# Patient Record
Sex: FEMALE | Race: BLACK OR AFRICAN AMERICAN | NOT HISPANIC OR LATINO | URBAN - METROPOLITAN AREA
[De-identification: names, ages, dates, MRNs, and addresses within clinical notes are randomized per-mention and may not be internally consistent; named-entity substitution may affect disease eponyms.]

---

## 2019-11-07 ENCOUNTER — INPATIENT (INPATIENT)
Facility: HOSPITAL | Age: 54
LOS: 1 days | Discharge: HOME | End: 2019-11-09
Attending: STUDENT IN AN ORGANIZED HEALTH CARE EDUCATION/TRAINING PROGRAM | Admitting: STUDENT IN AN ORGANIZED HEALTH CARE EDUCATION/TRAINING PROGRAM
Payer: COMMERCIAL

## 2019-11-07 VITALS
WEIGHT: 167.99 LBS | HEIGHT: 65 IN | TEMPERATURE: 97 F | DIASTOLIC BLOOD PRESSURE: 68 MMHG | SYSTOLIC BLOOD PRESSURE: 104 MMHG | HEART RATE: 66 BPM | RESPIRATION RATE: 18 BRPM | OXYGEN SATURATION: 100 %

## 2019-11-07 LAB
ALBUMIN SERPL ELPH-MCNC: 3.7 G/DL — SIGNIFICANT CHANGE UP (ref 3.3–5)
ALP SERPL-CCNC: 141 U/L — HIGH (ref 40–120)
ALT FLD-CCNC: 124 U/L — HIGH (ref 12–78)
ANION GAP SERPL CALC-SCNC: 5 MMOL/L — SIGNIFICANT CHANGE UP (ref 5–17)
APPEARANCE UR: CLEAR — SIGNIFICANT CHANGE UP
APTT BLD: 20.5 SEC — LOW (ref 27.5–36.3)
AST SERPL-CCNC: 167 U/L — HIGH (ref 15–37)
BACTERIA # UR AUTO: ABNORMAL
BASOPHILS # BLD AUTO: 0.04 K/UL — SIGNIFICANT CHANGE UP (ref 0–0.2)
BASOPHILS NFR BLD AUTO: 0.3 % — SIGNIFICANT CHANGE UP (ref 0–2)
BILIRUB SERPL-MCNC: 0.4 MG/DL — SIGNIFICANT CHANGE UP (ref 0.2–1.2)
BILIRUB UR-MCNC: NEGATIVE — SIGNIFICANT CHANGE UP
BUN SERPL-MCNC: 17 MG/DL — SIGNIFICANT CHANGE UP (ref 7–23)
CALCIUM SERPL-MCNC: 9.5 MG/DL — SIGNIFICANT CHANGE UP (ref 8.5–10.1)
CHLORIDE SERPL-SCNC: 109 MMOL/L — HIGH (ref 96–108)
CO2 SERPL-SCNC: 26 MMOL/L — SIGNIFICANT CHANGE UP (ref 22–31)
COLOR SPEC: YELLOW — SIGNIFICANT CHANGE UP
CREAT SERPL-MCNC: 0.79 MG/DL — SIGNIFICANT CHANGE UP (ref 0.5–1.3)
DIFF PNL FLD: ABNORMAL
EOSINOPHIL # BLD AUTO: 0.11 K/UL — SIGNIFICANT CHANGE UP (ref 0–0.5)
EOSINOPHIL NFR BLD AUTO: 0.9 % — SIGNIFICANT CHANGE UP (ref 0–6)
EPI CELLS # UR: SIGNIFICANT CHANGE UP
GLUCOSE SERPL-MCNC: 89 MG/DL — SIGNIFICANT CHANGE UP (ref 70–99)
GLUCOSE UR QL: NEGATIVE MG/DL — SIGNIFICANT CHANGE UP
HCT VFR BLD CALC: 43.2 % — SIGNIFICANT CHANGE UP (ref 34.5–45)
HGB BLD-MCNC: 14.1 G/DL — SIGNIFICANT CHANGE UP (ref 11.5–15.5)
IMM GRANULOCYTES NFR BLD AUTO: 0.4 % — SIGNIFICANT CHANGE UP (ref 0–1.5)
INR BLD: 1.06 RATIO — SIGNIFICANT CHANGE UP (ref 0.88–1.16)
KETONES UR-MCNC: ABNORMAL
LACTATE SERPL-SCNC: 1.6 MMOL/L — SIGNIFICANT CHANGE UP (ref 0.7–2)
LEUKOCYTE ESTERASE UR-ACNC: ABNORMAL
LIDOCAIN IGE QN: 170 U/L — SIGNIFICANT CHANGE UP (ref 73–393)
LYMPHOCYTES # BLD AUTO: 25.4 % — SIGNIFICANT CHANGE UP (ref 13–44)
LYMPHOCYTES # BLD AUTO: 3.02 K/UL — SIGNIFICANT CHANGE UP (ref 1–3.3)
MCHC RBC-ENTMCNC: 26.9 PG — LOW (ref 27–34)
MCHC RBC-ENTMCNC: 32.6 GM/DL — SIGNIFICANT CHANGE UP (ref 32–36)
MCV RBC AUTO: 82.3 FL — SIGNIFICANT CHANGE UP (ref 80–100)
MONOCYTES # BLD AUTO: 0.84 K/UL — SIGNIFICANT CHANGE UP (ref 0–0.9)
MONOCYTES NFR BLD AUTO: 7.1 % — SIGNIFICANT CHANGE UP (ref 2–14)
NEUTROPHILS # BLD AUTO: 7.81 K/UL — HIGH (ref 1.8–7.4)
NEUTROPHILS NFR BLD AUTO: 65.9 % — SIGNIFICANT CHANGE UP (ref 43–77)
NITRITE UR-MCNC: NEGATIVE — SIGNIFICANT CHANGE UP
NRBC # BLD: 0 /100 WBCS — SIGNIFICANT CHANGE UP (ref 0–0)
PH UR: 5 — SIGNIFICANT CHANGE UP (ref 5–8)
PLATELET # BLD AUTO: 215 K/UL — SIGNIFICANT CHANGE UP (ref 150–400)
POTASSIUM SERPL-MCNC: 3.8 MMOL/L — SIGNIFICANT CHANGE UP (ref 3.5–5.3)
POTASSIUM SERPL-SCNC: 3.8 MMOL/L — SIGNIFICANT CHANGE UP (ref 3.5–5.3)
PROT SERPL-MCNC: 7.4 GM/DL — SIGNIFICANT CHANGE UP (ref 6–8.3)
PROT UR-MCNC: NEGATIVE MG/DL — SIGNIFICANT CHANGE UP
PROTHROM AB SERPL-ACNC: 11.9 SEC — SIGNIFICANT CHANGE UP (ref 10–12.9)
RBC # BLD: 5.25 M/UL — HIGH (ref 3.8–5.2)
RBC # FLD: 13.6 % — SIGNIFICANT CHANGE UP (ref 10.3–14.5)
RBC CASTS # UR COMP ASSIST: SIGNIFICANT CHANGE UP /HPF (ref 0–4)
SODIUM SERPL-SCNC: 140 MMOL/L — SIGNIFICANT CHANGE UP (ref 135–145)
SP GR SPEC: 1.03 — HIGH (ref 1.01–1.02)
TROPONIN I SERPL-MCNC: <.015 NG/ML — SIGNIFICANT CHANGE UP (ref 0.01–0.04)
UROBILINOGEN FLD QL: NEGATIVE MG/DL — SIGNIFICANT CHANGE UP
WBC # BLD: 11.87 K/UL — HIGH (ref 3.8–10.5)
WBC # FLD AUTO: 11.87 K/UL — HIGH (ref 3.8–10.5)
WBC UR QL: SIGNIFICANT CHANGE UP

## 2019-11-07 PROCEDURE — 99221 1ST HOSP IP/OBS SF/LOW 40: CPT

## 2019-11-07 PROCEDURE — 99285 EMERGENCY DEPT VISIT HI MDM: CPT

## 2019-11-07 PROCEDURE — 93010 ELECTROCARDIOGRAM REPORT: CPT

## 2019-11-07 PROCEDURE — 76700 US EXAM ABDOM COMPLETE: CPT | Mod: 26

## 2019-11-07 RX ORDER — PIPERACILLIN AND TAZOBACTAM 4; .5 G/20ML; G/20ML
3.38 INJECTION, POWDER, LYOPHILIZED, FOR SOLUTION INTRAVENOUS ONCE
Refills: 0 | Status: COMPLETED | OUTPATIENT
Start: 2019-11-07 | End: 2019-11-07

## 2019-11-07 RX ORDER — ONDANSETRON 8 MG/1
4 TABLET, FILM COATED ORAL EVERY 6 HOURS
Refills: 0 | Status: DISCONTINUED | OUTPATIENT
Start: 2019-11-07 | End: 2019-11-09

## 2019-11-07 RX ORDER — MORPHINE SULFATE 50 MG/1
2 CAPSULE, EXTENDED RELEASE ORAL ONCE
Refills: 0 | Status: DISCONTINUED | OUTPATIENT
Start: 2019-11-07 | End: 2019-11-07

## 2019-11-07 RX ORDER — ONDANSETRON 8 MG/1
4 TABLET, FILM COATED ORAL ONCE
Refills: 0 | Status: COMPLETED | OUTPATIENT
Start: 2019-11-07 | End: 2019-11-07

## 2019-11-07 RX ORDER — SODIUM CHLORIDE 9 MG/ML
1000 INJECTION INTRAMUSCULAR; INTRAVENOUS; SUBCUTANEOUS ONCE
Refills: 0 | Status: COMPLETED | OUTPATIENT
Start: 2019-11-07 | End: 2019-11-07

## 2019-11-07 RX ORDER — MORPHINE SULFATE 50 MG/1
2 CAPSULE, EXTENDED RELEASE ORAL EVERY 6 HOURS
Refills: 0 | Status: DISCONTINUED | OUTPATIENT
Start: 2019-11-07 | End: 2019-11-09

## 2019-11-07 RX ORDER — FAMOTIDINE 10 MG/ML
20 INJECTION INTRAVENOUS ONCE
Refills: 0 | Status: COMPLETED | OUTPATIENT
Start: 2019-11-07 | End: 2019-11-07

## 2019-11-07 RX ORDER — CEFTRIAXONE 500 MG/1
1000 INJECTION, POWDER, FOR SOLUTION INTRAMUSCULAR; INTRAVENOUS ONCE
Refills: 0 | Status: COMPLETED | OUTPATIENT
Start: 2019-11-07 | End: 2019-11-07

## 2019-11-07 RX ORDER — SODIUM CHLORIDE 9 MG/ML
1000 INJECTION, SOLUTION INTRAVENOUS
Refills: 0 | Status: DISCONTINUED | OUTPATIENT
Start: 2019-11-07 | End: 2019-11-08

## 2019-11-07 RX ADMIN — MORPHINE SULFATE 2 MILLIGRAM(S): 50 CAPSULE, EXTENDED RELEASE ORAL at 21:21

## 2019-11-07 RX ADMIN — FAMOTIDINE 20 MILLIGRAM(S): 10 INJECTION INTRAVENOUS at 21:20

## 2019-11-07 RX ADMIN — PIPERACILLIN AND TAZOBACTAM 200 GRAM(S): 4; .5 INJECTION, POWDER, LYOPHILIZED, FOR SOLUTION INTRAVENOUS at 21:53

## 2019-11-07 RX ADMIN — ONDANSETRON 4 MILLIGRAM(S): 8 TABLET, FILM COATED ORAL at 21:20

## 2019-11-07 RX ADMIN — SODIUM CHLORIDE 1000 MILLILITER(S): 9 INJECTION INTRAMUSCULAR; INTRAVENOUS; SUBCUTANEOUS at 22:18

## 2019-11-07 RX ADMIN — SODIUM CHLORIDE 1000 MILLILITER(S): 9 INJECTION INTRAMUSCULAR; INTRAVENOUS; SUBCUTANEOUS at 21:20

## 2019-11-07 RX ADMIN — CEFTRIAXONE 100 MILLIGRAM(S): 500 INJECTION, POWDER, FOR SOLUTION INTRAMUSCULAR; INTRAVENOUS at 23:51

## 2019-11-07 RX ADMIN — PIPERACILLIN AND TAZOBACTAM 3.38 GRAM(S): 4; .5 INJECTION, POWDER, LYOPHILIZED, FOR SOLUTION INTRAVENOUS at 22:18

## 2019-11-07 RX ADMIN — MORPHINE SULFATE 2 MILLIGRAM(S): 50 CAPSULE, EXTENDED RELEASE ORAL at 21:45

## 2019-11-07 RX ADMIN — SODIUM CHLORIDE 1000 MILLILITER(S): 9 INJECTION INTRAMUSCULAR; INTRAVENOUS; SUBCUTANEOUS at 23:52

## 2019-11-07 NOTE — ED ADULT NURSE NOTE - CHPI ED NUR SYMPTOMS NEG
no diarrhea/no vomiting/no nausea/no fever/no abdominal distension/no chills/no blood in stool/no burning urination/no dysuria/no hematuria

## 2019-11-07 NOTE — ED PROVIDER NOTE - PHYSICAL EXAMINATION
Gen: Alert, c/o pain  Head: NC, AT, EOMI, normal lids/conjunctiva  ENT: normal hearing, patent oropharynx, MMM  Neck: supple, no tenderness/meningismus, Trachea midline  Pulm: Bilateral clear BS, normal resp effort, no wheeze/stridor/retractions  CV: RRR, no M/R/G, +dist pulses  Abd: soft, +epigastric TTP, ND, +BS, no guarding/rebound tenderness  Mskel: no edema/erythema/cyanosis  Skin: no rash  Neuro: no sensory/motor deficits

## 2019-11-07 NOTE — ED PROVIDER NOTE - OBJECTIVE STATEMENT
Pertinent PMH/PSH/FHx/SHx and Review of Systems contained within:    52yo F w no significant PMH/PSH, s/p recent completion of abx for hematuria/presumed UTI presents to ED for eval of abd pain s/p eating coconut cake.  Pt states she was in usual state of health until after eating the cake when pain started, +vomiting.  Pt states she had one normal BM without improvement of pain.  Denies fever, urinary sx, previous episodes, diarrhea, CP, SOB.      No fever, No photophobia/eye pain/changes in vision, No ear pain/sore throat/dysphagia, No chest pain/palpitations, no SOB/cough/wheeze/stridor, +abdominal pain, No neck/back pain, no rash, no changes in neurological status/function.

## 2019-11-07 NOTE — H&P ADULT - ATTENDING COMMENTS
I saw and examined the patient at bedside. Complaining of mild RUQ/epigastric discomfort, but abdomen is soft, non-distended, non-TTP in other quadrants. Was planned for HIDA given that imaging was not consistent with acute cholecystitis.

## 2019-11-07 NOTE — H&P ADULT - HISTORY OF PRESENT ILLNESS
52y/o female with no significant PMH and PSH of tubo-ligation present to er with c/o epigastric abdominal pain that started a couple hrs ago. Pain is severe 10/10, started suddenly after she ate a coconut confection, located in the epigastric region of her stomach, denies radiation, denies taking anything to relieve pain. Pain associated with nausea and 1 episode of vomiting( gastric content). Denies similar symptoms in past. Denies fever /chills, no cp/sob. +flatus and normal BM, last BM was this morning. Recently had annual PE, states she has been having a little discomfort and burning when she urinates but thought she had a yeast infection so she was using OTC feminine products.

## 2019-11-07 NOTE — ED ADULT TRIAGE NOTE - CHIEF COMPLAINT QUOTE
Pt c/o Upper ABD pain and vomiting x 2 hrs. Pt stated, she ate coconut cake, shortly after started to feel the pain.

## 2019-11-07 NOTE — H&P ADULT - ASSESSMENT
52y/o female with abdominal pain, found to have gallstones, gallbladder wall thickening and dilated CBD (8mm) on US, also with UTI  -as discussed with Dr. Burden  -admit pt, r/o cholecystitis vs choledocholithiasis  -HIDA Scan  -dvt/gi ppx  - pain management  -NPO/IVF  - Rocephin 1gram for UTI  - trend Labs  - dvt ppx

## 2019-11-07 NOTE — ED ADULT NURSE NOTE - OBJECTIVE STATEMENT
Patient reports 2 hours of diffuse abdominal pain with nausea and vomiting after earing carrot cake. Patient reports recent UTI.

## 2019-11-07 NOTE — H&P ADULT - NSHPREVIEWOFSYSTEMS_GEN_ALL_CORE
No fever, No photophobia/eye pain/changes in vision, No ear pain/sore throat/dysphagia, No chest pain/palpitations, no SOB/cough/wheeze/stridor, +abdominal pain, No neck/back pain, no rash, no changes in neurological status/function.

## 2019-11-08 ENCOUNTER — TRANSCRIPTION ENCOUNTER (OUTPATIENT)
Age: 54
End: 2019-11-08

## 2019-11-08 DIAGNOSIS — K83.8 OTHER SPECIFIED DISEASES OF BILIARY TRACT: ICD-10-CM

## 2019-11-08 DIAGNOSIS — Z31.0 ENCOUNTER FOR REVERSAL OF PREVIOUS STERILIZATION: Chronic | ICD-10-CM

## 2019-11-08 DIAGNOSIS — R94.5 ABNORMAL RESULTS OF LIVER FUNCTION STUDIES: ICD-10-CM

## 2019-11-08 LAB
ALBUMIN SERPL ELPH-MCNC: 3.2 G/DL — LOW (ref 3.3–5)
ALP SERPL-CCNC: 123 U/L — HIGH (ref 40–120)
ALT FLD-CCNC: 156 U/L — HIGH (ref 12–78)
ANION GAP SERPL CALC-SCNC: 5 MMOL/L — SIGNIFICANT CHANGE UP (ref 5–17)
AST SERPL-CCNC: 138 U/L — HIGH (ref 15–37)
BILIRUB DIRECT SERPL-MCNC: 0.11 MG/DL — SIGNIFICANT CHANGE UP (ref 0.05–0.2)
BILIRUB INDIRECT FLD-MCNC: 0.4 MG/DL — SIGNIFICANT CHANGE UP (ref 0.2–1)
BILIRUB SERPL-MCNC: 0.5 MG/DL — SIGNIFICANT CHANGE UP (ref 0.2–1.2)
BLD GP AB SCN SERPL QL: SIGNIFICANT CHANGE UP
BUN SERPL-MCNC: 9 MG/DL — SIGNIFICANT CHANGE UP (ref 7–23)
CALCIUM SERPL-MCNC: 9 MG/DL — SIGNIFICANT CHANGE UP (ref 8.5–10.1)
CHLORIDE SERPL-SCNC: 113 MMOL/L — HIGH (ref 96–108)
CO2 SERPL-SCNC: 25 MMOL/L — SIGNIFICANT CHANGE UP (ref 22–31)
CREAT SERPL-MCNC: 0.62 MG/DL — SIGNIFICANT CHANGE UP (ref 0.5–1.3)
CULTURE RESULTS: SIGNIFICANT CHANGE UP
GLUCOSE SERPL-MCNC: 80 MG/DL — SIGNIFICANT CHANGE UP (ref 70–99)
HCT VFR BLD CALC: 37.9 % — SIGNIFICANT CHANGE UP (ref 34.5–45)
HGB BLD-MCNC: 12.3 G/DL — SIGNIFICANT CHANGE UP (ref 11.5–15.5)
MCHC RBC-ENTMCNC: 27 PG — SIGNIFICANT CHANGE UP (ref 27–34)
MCHC RBC-ENTMCNC: 32.5 GM/DL — SIGNIFICANT CHANGE UP (ref 32–36)
MCV RBC AUTO: 83.3 FL — SIGNIFICANT CHANGE UP (ref 80–100)
NRBC # BLD: 0 /100 WBCS — SIGNIFICANT CHANGE UP (ref 0–0)
PLATELET # BLD AUTO: 189 K/UL — SIGNIFICANT CHANGE UP (ref 150–400)
POTASSIUM SERPL-MCNC: 4.1 MMOL/L — SIGNIFICANT CHANGE UP (ref 3.5–5.3)
POTASSIUM SERPL-SCNC: 4.1 MMOL/L — SIGNIFICANT CHANGE UP (ref 3.5–5.3)
PROT SERPL-MCNC: 6.5 GM/DL — SIGNIFICANT CHANGE UP (ref 6–8.3)
RBC # BLD: 4.55 M/UL — SIGNIFICANT CHANGE UP (ref 3.8–5.2)
RBC # FLD: 13.8 % — SIGNIFICANT CHANGE UP (ref 10.3–14.5)
SODIUM SERPL-SCNC: 143 MMOL/L — SIGNIFICANT CHANGE UP (ref 135–145)
SPECIMEN SOURCE: SIGNIFICANT CHANGE UP
WBC # BLD: 6.91 K/UL — SIGNIFICANT CHANGE UP (ref 3.8–10.5)
WBC # FLD AUTO: 6.91 K/UL — SIGNIFICANT CHANGE UP (ref 3.8–10.5)

## 2019-11-08 PROCEDURE — 78226 HEPATOBILIARY SYSTEM IMAGING: CPT | Mod: 26

## 2019-11-08 PROCEDURE — 99231 SBSQ HOSP IP/OBS SF/LOW 25: CPT

## 2019-11-08 PROCEDURE — 74181 MRI ABDOMEN W/O CONTRAST: CPT | Mod: 26

## 2019-11-08 RX ORDER — PANTOPRAZOLE SODIUM 20 MG/1
40 TABLET, DELAYED RELEASE ORAL DAILY
Refills: 0 | Status: DISCONTINUED | OUTPATIENT
Start: 2019-11-08 | End: 2019-11-09

## 2019-11-08 RX ORDER — LANOLIN ALCOHOL/MO/W.PET/CERES
3 CREAM (GRAM) TOPICAL AT BEDTIME
Refills: 0 | Status: DISCONTINUED | OUTPATIENT
Start: 2019-11-08 | End: 2019-11-09

## 2019-11-08 RX ORDER — SINCALIDE 5 UG/5ML
1.5 INJECTION, POWDER, LYOPHILIZED, FOR SOLUTION INTRAVENOUS ONCE
Refills: 0 | Status: COMPLETED | OUTPATIENT
Start: 2019-11-08 | End: 2019-11-08

## 2019-11-08 RX ADMIN — PANTOPRAZOLE SODIUM 40 MILLIGRAM(S): 20 TABLET, DELAYED RELEASE ORAL at 13:03

## 2019-11-08 RX ADMIN — Medication 3 MILLIGRAM(S): at 21:44

## 2019-11-08 RX ADMIN — SINCALIDE 1.5 MICROGRAM(S): 5 INJECTION, POWDER, LYOPHILIZED, FOR SOLUTION INTRAVENOUS at 10:05

## 2019-11-08 NOTE — CONSULT NOTE ADULT - SUBJECTIVE AND OBJECTIVE BOX
Chief Complaint:  Patient is a 53y old  Female who presents with a chief complaint of Abdominal pain, gallstones, dilated CBD (2019 11:39)      HPI:  52y/o female with no significant PMH and PSH of tubo-ligation present to er with c/o epigastric abdominal pain that started a couple hrs ago. Pain is severe 10/10, started suddenly after she ate a coconut confection, located in the epigastric region of her stomach, denies radiation, denies taking anything to relieve pain. Pain associated with nausea and 1 episode of vomiting( gastric content). Denies similar symptoms in past. Denies fever /chills, no cp/sob. +flatus and normal BM, last BM was this morning. Recently had annual PE, states she has been having a little discomfort and burning when she urinates but thought she had a yeast infection so she was using OTC feminine products. (2019 22:33). GI was consulted for ther above. She reports a few hours of severe epigastric, non-radiating abdominal discomfort, starting 30 minutes after eating, and lasting for a few hours, assc with nausea/nonbloody nonbilious vomiting. She denies any new foods, new medications. She denies any recent travel or sick contacts. She denies any similar previous episodes of abdominal discomfort. She denies any NSAID or ETOH use. No previous endoscopy. Last colonoscopy ~, normal per patient.  Currently she was feeling well without any abdominal pain, tolerating full liquid diet well.       PMH/PSH:PAST MEDICAL & SURGICAL HISTORY:  No pertinent past medical history  H/O post-sterilization tuboplasty      Allergies:  No Known Allergies      Medications:  dextrose 5% + sodium chloride 0.9%. 1000 milliLiter(s) IV Continuous <Continuous>  melatonin 3 milliGRAM(s) Oral at bedtime  morphine  - Injectable 2 milliGRAM(s) IV Push every 6 hours PRN  ondansetron Injectable 4 milliGRAM(s) IV Push every 6 hours PRN  pantoprazole  Injectable 40 milliGRAM(s) IV Push daily      Review of Systems:    General:  No weight loss, fevers, chills, night sweats, fatigue,   Eyes:  Good vision, no reported pain  ENT:  No sore throat, pain, runny nose, dysphagia  CV:  No pain, palpitations, hypo/hypertension  Resp:  No dyspnea, cough, tachypnea, wheezing  GI:  as per HPI   :  No pain, bleeding, incontinence, nocturia  Muscle:  No pain, weakness  Breast:  No pain, abscess, mass, discharge  Neuro:  No weakness, tingling, memory problems  Psych:  No fatigue, insomnia, mood problems, depression  Endocrine:  No polyuria, polydipsia, cold/heat intolerance  Heme:  No petechiae, ecchymosis, easy bruisability  Skin:  No rash, tattoos, scars, edema    Relevant Family History:   FAMILY HISTORY:  Family history of diabetes mellitus (DM)  FH: HTN (hypertension)      Relevant Social History: Alcohol ( -) , Tobacco ( -) , Illicit drugs (- )     Physical Exam:    Vital Signs:  Vital Signs Last 24 Hrs  T(C): 36.1 (2019 11:19), Max: 36.7 (2019 23:30)  T(F): 96.9 (2019 11:19), Max: 98.1 (2019 23:30)  HR: 64 (2019 11:19) (60 - 78)  BP: 113/61 (2019 11:19) (98/59 - 117/64)  BP(mean): --  RR: 18 (2019 11:19) (18 - 18)  SpO2: 100% (2019 11:19) (99% - 100%)  Daily Height in cm: 165.1 (2019 19:35)    Daily Weight in k.5 (2019 00:45)    General:  Appears stated age, well-groomed, well-nourished, no distress  HEENT:  NC/AT,  conjunctivae clear and pink, no thyromegaly, nodules, adenopathy, no JVD, anicteric sclera  Chest:  Full & symmetric excursion, no increased effort, breath sounds clear  Cardiovascular:  Regular rhythm, S1, S2, no murmur/rub/S3/S4, no abdominal bruit, no edema  Abdomen:  Soft, RUQ/RLQ ttp, non distended, normoactive bowel sounds,  no masses  Extremities:  no cyanosis, clubbing or edema  Skin:  No rash/erythema/ecchymoses/petechiae/wounds/abscess/warm/dry  Neuro/Psych:  Alert, oriented, no asterixis, no tremor, no encephalopathy    Laboratory:                          12.3   6.91  )-----------( 189      ( 2019 06:51 )             37.9     11-    143  |  113<H>  |  9   ----------------------------<  80  4.1   |  25  |  0.62    Ca    9.0      2019 06:51    TPro  6.5  /  Alb  3.2<L>  /  TBili  0.5  /  DBili  .11  /  AST  138<H>  /  ALT  156<H>  /  AlkPhos  123<H>      LIVER FUNCTIONS - ( 2019 06:51 )  Alb: 3.2 g/dL / Pro: 6.5 gm/dL / ALK PHOS: 123 U/L / ALT: 156 U/L / AST: 138 U/L / GGT: x           PT/INR - ( 2019 21:09 )   PT: 11.9 sec;   INR: 1.06 ratio         PTT - ( 2019 21:09 )  PTT:20.5 sec  Urinalysis Basic - ( 2019 20:25 )    Color: Yellow / Appearance: Clear / S.030 / pH: x  Gluc: x / Ketone: Trace  / Bili: Negative / Urobili: Negative mg/dL   Blood: x / Protein: Negative mg/dL / Nitrite: Negative   Leuk Esterase: Trace / RBC: 0-2 /HPF / WBC 3-5   Sq Epi: x / Non Sq Epi: Few / Bacteria: Many      Amylase Serum--      Lipase [id number] U/L       Ammonia--      Intake and Output    19 @ 07:01  -  19 @ 07:00  --------------------------------------------------------  IN: 1500 mL / OUT: 0 mL / NET: 1500 mL        Imaging:    < from: US Abdomen Complete (19 @ 21:12) >    EXAM:  US ABDOMINAL COMPLETE                            PROCEDURE DATE:  2019          INTERPRETATION:  CLINICAL INFORMATION: Epigastric pain and vomiting.    COMPARISON: None available.    TECHNIQUE: Sonography of the abdomen.     FINDINGS:    Liver: Within normal limits.    Bile ducts: Mildly dilated common duct measuring up to 8 mm.     Gallbladder: Gallstones. Wall thickness upper limits of normal at 3 mm.   No pericholecystic fluid. Sonographic Child's sign was not reported.    Pancreas: Visualized portions are within normal limits.    Spleen: Not well visualized. Grossly measures 7.6 x 2.7 cm.    Right kidney: 11.1 cm. No hydronephrosis.    Left kidney: Not well visualized. Grossly measures 9.4 cm in the sagittal   dimension. No gross hydronephrosis.    Ascites: None.    Aorta and IVC: Visualized portions are within normal limits.    IMPRESSION:     Gallstones with upper limits normal wall thickness. Dilated CBD to 8 mm.   Consider correlation with needle medicine DISIDA scan if there is   clinical suspicion for acute cholecystitis.    TYRA LYNN M.D., RADIOLOGIST  This document has been electronically signed. 2019  9:20PM          < from: NM Hepatobiliary Imaging (19 @ 10:54) >    EXAM:  NM HEPATOBILIARY IMG                            PROCEDURE DATE:  2019          INTERPRETATION:    RADIOPHARMACEUTICAL:  99mTc-Mebrofenin  DOSE: 3.0 mCi IV    CLINICAL INFORMATION: 53 year old female with epigastric pain and   vomiting,cholelithiasis, referred to evaluate for acute cholecystitis    TECHNIQUE: Dynamic images of the anterior abdomen were obtained for 1   hour immediately following radiotracer injection. 1.5 mcg Sincalide in 50   cc normal saline was administered via IV infusion over 30 minutes   approximately one hour post tracer injection. Dynamic imaging of the   anterior abdomen was obtained for another 30 minutes. Static images of   the abdomen in the anterior, right anterior oblique and right lateral   projections were also obtained.    COMPARISON: No previous hepatobiliary scan scan for comparison    FINDINGS: There is prompt uptake of the injected radiotracer by the   hepatocytes. The gallbladder is first visualized at 50 minutes post   tracer injection and bowel activity by approximately 65 minutes There is   normal tracer clearance from the liver by the end of the study.     IMPRESSION: Normal hepatobiliary scan.     No scan evidence of acute cholecystitis.     EBONY ESCOBEDO M.D., NUCLEAR MEDICINE ATTENDING  This document has been electronically signed. 2019 11:07AM

## 2019-11-08 NOTE — CONSULT NOTE ADULT - ASSESSMENT
53 F with no PMHx presents with a few hours of epigastric abdominal discomfort after eating.     VSS on presentation. Labs reveal T bili 0.5, , , Alk phos 123. US revealed dilated CBD 8 mm with cholelithiasis and gallbladder wall thickeness ULN. HIDA scan without evidence of acute cholecystitis.     She has one moderate risk factor for choledochotlihiasis (CBD >  6 mm) , however T bili normal. Would recommend MRCP for evaluation of choledochothiasis.

## 2019-11-08 NOTE — CONSULT NOTE ADULT - PROBLEM SELECTOR RECOMMENDATION 9
-MRCP for evaluation of choledocholithiasis   -Appreciate surgery recommendations   -Trend LFTs daily -MRCP for evaluation of choledocholithiasis   -Protonix 40 IV daily   -Appreciate surgery recommendations   -Trend LFTs daily

## 2019-11-08 NOTE — PROGRESS NOTE ADULT - SUBJECTIVE AND OBJECTIVE BOX
52 y/o female with abdominal pain, found to have cholelithiasis, gallbladder wall thickening and dilated CBD (8mm).      Patient seen and examined at bedside.    She reports abdominal pain has improved since yesterday.    She reports one episode of vomiting yesterday, but has not vomited since.   Denies pain, nausea/ vomiting.   Tolerating diet.    Vital Signs Last 24 Hrs  T(F): 96.9 (11-08-19 @ 11:19), Max: 98.1 (11-07-19 @ 23:30)  HR: 64 (11-08-19 @ 11:19)  BP: 113/61 (11-08-19 @ 11:19)  RR: 18 (11-08-19 @ 11:19)  SpO2: 100% (11-08-19 @ 11:19)        GENERAL: Alert, NAD  CHEST/LUNG: Clear to auscultation bilaterally, respirations nonlabored  HEART: S1S2, Regular rate and rhythm;   ABDOMEN: + Bowel sounds, soft, Nontender, Nondistended  EXTREMITIES:  no calf tenderness, No edema    I&O's Detail    07 Nov 2019 07:01  -  08 Nov 2019 07:00  --------------------------------------------------------  IN:    dextrose 5% + sodium chloride 0.9%.: 1500 mL  Total IN: 1500 mL    OUT:  Total OUT: 0 mL    Total NET: 1500 mL          LABS:                        12.3   6.91  )-----------( 189      ( 08 Nov 2019 06:51 )             37.9     11-08    143  |  113<H>  |  9   ----------------------------<  80  4.1   |  25  |  0.62    Ca    9.0      08 Nov 2019 06:51    TPro  6.5  /  Alb  3.2<L>  /  TBili  0.5  /  DBili  .11  /  AST  138<H>  /  ALT  156<H>  /  AlkPhos  123<H>  11-08    PT/INR - ( 07 Nov 2019 21:09 )   PT: 11.9 sec;   INR: 1.06 ratio         PTT - ( 07 Nov 2019 21:09 )  PTT:20.5 sec    RADIOLOGY & ADDITIONAL STUDIES:     53y old  Female s/p secondary to CHOLECYSTITIS  ABDOMINAL PAIN AND VOMITING  , POD# with PMH No pertinent past medical history    - continue local wound care  - antibiotics per ID  - f/u labs  - DVT prophylaxis, Incentive Spirometer, OOB, Ambulating, pain control 54 y/o female with abdominal pain, found to have cholelithiasis, gallbladder wall thickening and dilated CBD (8mm).      Patient seen and examined at bedside.    She reports abdominal pain has improved since yesterday.    She reports one episode of NBNB vomiting yesterday, but has not vomited since.   Pt is NPO.      Vital Signs Last 24 Hrs  T(F): 96.9 (11-08-19 @ 11:19), Max: 98.1 (11-07-19 @ 23:30)  HR: 64 (11-08-19 @ 11:19)  BP: 113/61 (11-08-19 @ 11:19)  RR: 18 (11-08-19 @ 11:19)  SpO2: 100% (11-08-19 @ 11:19)      GENERAL: Alert, NAD  CHEST/LUNG: Clear to auscultation bilaterally, respirations nonlabored  HEART: S1S2, Regular rate and rhythm;   ABDOMEN: +Bowel sounds, soft, Nondistended, mildly tender to palpation of right upper quadrant, -Child's, no rebound/guarding  EXTREMITIES:  no calf tenderness, No edema    I&O's Detail    07 Nov 2019 07:01  -  08 Nov 2019 07:00  --------------------------------------------------------  IN:    dextrose 5% + sodium chloride 0.9%.: 1500 mL  Total IN: 1500 mL    OUT:  Total OUT: 0 mL    Total NET: 1500 mL        LABS:                        12.3   6.91  )-----------( 189      ( 08 Nov 2019 06:51 )             37.9     11-08    143  |  113<H>  |  9   ----------------------------<  80  4.1   |  25  |  0.62    Ca    9.0      08 Nov 2019 06:51    TPro  6.5  /  Alb  3.2<L>  /  TBili  0.5  /  DBili  .11  /  AST  138<H>  /  ALT  156<H>  /  AlkPhos  123<H>  11-08    PT/INR - ( 07 Nov 2019 21:09 )   PT: 11.9 sec;   INR: 1.06 ratio         PTT - ( 07 Nov 2019 21:09 )  PTT:20.5 sec    Impression: 54 y/o female with abdominal pain, found to have cholelithiasis, gallbladder wall thickening and dilated CBD (8mm).  HIDA scan negative.  Pt afebrile no leukocytosis.      Plan:     - continue local wound care  - antibiotics per ID  - f/u labs  - DVT prophylaxis, Incentive Spirometer, OOB, Ambulating, pain control 54 y/o female with abdominal pain, found to have cholelithiasis, gallbladder wall thickening and dilated CBD (8mm).      Patient seen and examined at bedside with Dr. Burden,    Pt reports abdominal pain has improved since yesterday.    She reports one episode of NBNB vomiting yesterday, but has not vomited since.   Pt is NPO.      Vital Signs Last 24 Hrs  T(F): 96.9 (11-08-19 @ 11:19), Max: 98.1 (11-07-19 @ 23:30)  HR: 64 (11-08-19 @ 11:19)  BP: 113/61 (11-08-19 @ 11:19)  RR: 18 (11-08-19 @ 11:19)  SpO2: 100% (11-08-19 @ 11:19)      GENERAL: Alert, NAD  CHEST/LUNG: Clear to auscultation bilaterally, respirations nonlabored  HEART: S1S2, Regular rate and rhythm;   ABDOMEN: +Bowel sounds, soft, Nondistended, mildly tender to palpation of right upper quadrant, -Child's, no rebound/guarding  EXTREMITIES:  no calf tenderness, No edema    I&O's Detail    07 Nov 2019 07:01  -  08 Nov 2019 07:00  --------------------------------------------------------  IN:    dextrose 5% + sodium chloride 0.9%.: 1500 mL  Total IN: 1500 mL    OUT:  Total OUT: 0 mL    Total NET: 1500 mL        LABS:                        12.3   6.91  )-----------( 189      ( 08 Nov 2019 06:51 )             37.9     11-08    143  |  113<H>  |  9   ----------------------------<  80  4.1   |  25  |  0.62    Ca    9.0      08 Nov 2019 06:51    TPro  6.5  /  Alb  3.2<L>  /  TBili  0.5  /  DBili  .11  /  AST  138<H>  /  ALT  156<H>  /  AlkPhos  123<H>  11-08    PT/INR - ( 07 Nov 2019 21:09 )   PT: 11.9 sec;   INR: 1.06 ratio         PTT - ( 07 Nov 2019 21:09 )  PTT:20.5 sec    Impression: 54 y/o female with abdominal pain, found to have cholelithiasis, gallbladder wall thickening and dilated CBD (8mm).  HIDA scan negative.  Pt afebrile, no leukocytosis.      Plan:   -GI consulted to rule out PUD.  -Advanced to full liquid diet.   -f/u labs  -DVT prophylaxis, Incentive Spirometer, OOB, Ambulating, pain control   -Discussed with Dr. Burden

## 2019-11-09 ENCOUNTER — TRANSCRIPTION ENCOUNTER (OUTPATIENT)
Age: 54
End: 2019-11-09

## 2019-11-09 VITALS
RESPIRATION RATE: 17 BRPM | HEART RATE: 71 BPM | TEMPERATURE: 97 F | OXYGEN SATURATION: 100 % | SYSTOLIC BLOOD PRESSURE: 115 MMHG | DIASTOLIC BLOOD PRESSURE: 63 MMHG

## 2019-11-09 LAB
ALBUMIN SERPL ELPH-MCNC: 3.2 G/DL — LOW (ref 3.3–5)
ALP SERPL-CCNC: 125 U/L — HIGH (ref 40–120)
ALT FLD-CCNC: 100 U/L — HIGH (ref 12–78)
ANION GAP SERPL CALC-SCNC: 7 MMOL/L — SIGNIFICANT CHANGE UP (ref 5–17)
AST SERPL-CCNC: 49 U/L — HIGH (ref 15–37)
BILIRUB DIRECT SERPL-MCNC: 0.15 MG/DL — SIGNIFICANT CHANGE UP (ref 0.05–0.2)
BILIRUB INDIRECT FLD-MCNC: 0.4 MG/DL — SIGNIFICANT CHANGE UP (ref 0.2–1)
BILIRUB SERPL-MCNC: 0.6 MG/DL — SIGNIFICANT CHANGE UP (ref 0.2–1.2)
BUN SERPL-MCNC: 9 MG/DL — SIGNIFICANT CHANGE UP (ref 7–23)
CALCIUM SERPL-MCNC: 9.2 MG/DL — SIGNIFICANT CHANGE UP (ref 8.5–10.1)
CHLORIDE SERPL-SCNC: 113 MMOL/L — HIGH (ref 96–108)
CO2 SERPL-SCNC: 24 MMOL/L — SIGNIFICANT CHANGE UP (ref 22–31)
CREAT SERPL-MCNC: 0.67 MG/DL — SIGNIFICANT CHANGE UP (ref 0.5–1.3)
GLUCOSE SERPL-MCNC: 74 MG/DL — SIGNIFICANT CHANGE UP (ref 70–99)
HAV IGG SER QL IA: REACTIVE
HAV IGM SER-ACNC: SIGNIFICANT CHANGE UP
HBV CORE IGM SER-ACNC: SIGNIFICANT CHANGE UP
HBV SURFACE AB SER-ACNC: REACTIVE
HBV SURFACE AG SER-ACNC: SIGNIFICANT CHANGE UP
HCT VFR BLD CALC: 38.9 % — SIGNIFICANT CHANGE UP (ref 34.5–45)
HCV AB S/CO SERPL IA: 0.21 S/CO — SIGNIFICANT CHANGE UP (ref 0–0.99)
HCV AB SERPL-IMP: SIGNIFICANT CHANGE UP
HGB BLD-MCNC: 12.8 G/DL — SIGNIFICANT CHANGE UP (ref 11.5–15.5)
MCHC RBC-ENTMCNC: 27 PG — SIGNIFICANT CHANGE UP (ref 27–34)
MCHC RBC-ENTMCNC: 32.9 GM/DL — SIGNIFICANT CHANGE UP (ref 32–36)
MCV RBC AUTO: 82.1 FL — SIGNIFICANT CHANGE UP (ref 80–100)
NRBC # BLD: 0 /100 WBCS — SIGNIFICANT CHANGE UP (ref 0–0)
PLATELET # BLD AUTO: 194 K/UL — SIGNIFICANT CHANGE UP (ref 150–400)
POTASSIUM SERPL-MCNC: 3.9 MMOL/L — SIGNIFICANT CHANGE UP (ref 3.5–5.3)
POTASSIUM SERPL-SCNC: 3.9 MMOL/L — SIGNIFICANT CHANGE UP (ref 3.5–5.3)
PROT SERPL-MCNC: 6.5 GM/DL — SIGNIFICANT CHANGE UP (ref 6–8.3)
RBC # BLD: 4.74 M/UL — SIGNIFICANT CHANGE UP (ref 3.8–5.2)
RBC # FLD: 13.6 % — SIGNIFICANT CHANGE UP (ref 10.3–14.5)
SODIUM SERPL-SCNC: 144 MMOL/L — SIGNIFICANT CHANGE UP (ref 135–145)
WBC # BLD: 5.29 K/UL — SIGNIFICANT CHANGE UP (ref 3.8–10.5)
WBC # FLD AUTO: 5.29 K/UL — SIGNIFICANT CHANGE UP (ref 3.8–10.5)

## 2019-11-09 PROCEDURE — 99238 HOSP IP/OBS DSCHRG MGMT 30/<: CPT

## 2019-11-09 RX ADMIN — PANTOPRAZOLE SODIUM 40 MILLIGRAM(S): 20 TABLET, DELAYED RELEASE ORAL at 12:11

## 2019-11-09 NOTE — DISCHARGE NOTE NURSING/CASE MANAGEMENT/SOCIAL WORK - NSTOBACCONEVERSMOKERY/N_GEN_A
Thank you for coming to the [facility] Emergency Department.     Please follow up with your primary care provider in the next 1-2 weeks for a recheck of your symptoms.     OK to continue your medications without change.     It headache returns, try tylenol or ibuprofen and warm packs to your neck.     Please return to the ER at any time if you have concerns about your mental health, specifically if you develop suicidal thoughts or overwhelming anxiety. Please discuss your anxiety with your primary care provider when you follow up.     Follow up with Neurology as recommended.      No

## 2019-11-09 NOTE — PROGRESS NOTE ADULT - ATTENDING COMMENTS
Will follow up with GI for assessment of liver and possible EGD. Will follow as outpatient for eventual elective laparoscopic cholecystectomy. Patient agreeable with this plan.
HIDA shows normal uptake of bile by gallbladder, therefore not consistent with acute cholecystitis.

## 2019-11-09 NOTE — DISCHARGE NOTE PROVIDER - NSDCCPCAREPLAN_GEN_ALL_CORE_FT
PRINCIPAL DISCHARGE DIAGNOSIS  Diagnosis: Elevated LFTs  Assessment and Plan of Treatment: Elevated LFTs

## 2019-11-09 NOTE — DISCHARGE NOTE PROVIDER - HOSPITAL COURSE
52 y/o female with abdominal pain, found to have cholelithiasis, gallbladder wall thickening and dilated CBD (8mm).  HIDA scan negative. MRCP showed cholelithiasis. No choledocholithiasis or biliary ductal dilatation. GI was consulted.     At the time of discharge, the patient was hemodynamically stable, was tolerating PO diet, was voiding urine, was ambulating, and was comfortable without pain. Patient instructed to follow up and to call the office to make an appointment.

## 2019-11-09 NOTE — PROGRESS NOTE ADULT - SUBJECTIVE AND OBJECTIVE BOX
Pt seen and examined at bedside, no acute issues. Pt had HIDA and MRCP yesterday that were WNL. Abdominal has resolved since admission, no N/V, tolerating regular diet.  Denies chest pain, dyspnea, cough. Denies dysuria, no frequency.    T(F): 97 (11-09-19 @ 06:15), Max: 98.2 (11-08-19 @ 17:39)  HR: 58 (11-09-19 @ 06:15) (58 - 64)  BP: 110/53 (11-09-19 @ 06:15) (110/53 - 122/58)  RR: 17 (11-09-19 @ 06:15) (17 - 18)  SpO2: 100% (11-09-19 @ 06:15) (100% - 100%)  Wt(kg): --  CAPILLARY BLOOD GLUCOSE      PHYSICAL EXAM:  General: NAD, WDWN  Neuro:  Alert & oriented x 3  HEENT: NCAT, EOMI, conjunctiva clear  CV: +S1+S2 regular rate and rhythm  Lung: clear to ausculation bilaterally, respirations nonlabored, good inspiratory effort  Abdomen: soft, NTND. Normoactive BS, no cva tenderness  Extremities: no pedal edema or calf tenderness noted     LABS:                        12.8   5.29  )-----------( 194      ( 09 Nov 2019 07:12 )             38.9     11-09    144  |  113<H>  |  9   ----------------------------<  74  3.9   |  24  |  0.67    Ca    9.2      09 Nov 2019 07:12    TPro  6.5  /  Alb  3.2<L>  /  TBili  0.6  /  DBili  .15  /  AST  49<H>  /  ALT  100<H>  /  AlkPhos  125<H>  11-09    PT/INR - ( 07 Nov 2019 21:09 )   PT: 11.9 sec;   INR: 1.06 ratio     PTT - ( 07 Nov 2019 21:09 )  PTT:20.5 sec   Hepatitis A IgM Antibody (11.08.19 @ 23:56)    Hepatitis A IgM Antibody: Nonreact  Hepatitis A IgG w/reflex HEP-M (11.08.19 @ 23:56)    Hepatitis A IgG Ab Result: Reactive      Hepatitis B Surface Antigen (11.08.19 @ 23:56)    Hepatitis B Surface Antigen: Nonreact  Hepatitis B Core IgM Antibody (11.08.19 @ 23:56)    Hepatitis B Core IgM Antibody: Nonreact      I&O's Detail    08 Nov 2019 07:01  -  09 Nov 2019 07:00  --------------------------------------------------------  IN:    dextrose 5% + sodium chloride 0.9%: 1250 mL    Oral Fluid: 440 mL  Total IN: 1690 mL    OUT:  Total OUT: 0 mL  Total NET: 1690 mL    09 Nov 2019 07:01  -  09 Nov 2019 10:53  --------------------------------------------------------  IN:    Oral Fluid: 220 mL  Total IN: 220 mL    OUT:  Total OUT: 0 mL    Total NET: 220 mL    Culture Results:   <10,000 CFU/mL Normal Urogenital Nataliya (11-08 @ 00:46)  < from: NM Hepatobiliary Imaging (11.08.19 @ 10:54) >  FINDINGS: There is prompt uptake of the injected radiotracer by the   hepatocytes. The gallbladder is first visualized at 50 minutes post   tracer injection and bowel activity by approximately 65 minutes There is   normal tracer clearance from the liver by the end of the study.     IMPRESSION: Normal hepatobiliary scan.     < end of copied text >    < from: MR MRCP No Cont (11.08.19 @ 15:59) >  There are several small hepatic cysts measuring up to 1 cm.  No intra or   extrahepatic biliary dilatation is noted. The CBD measures 7 mm. No   intraductal biliary calculi are identified. The gallbladder is visualized   and contains numerous small stones.     The pancreas demonstrates normal T1 signal without focal lesion. The   pancreatic duct is normal in caliber.     There are renal cysts measuring up to 1 cm. The spleen, adrenal glands,   and kidneys are otherwise unremarkable in appearance.      There is no retroperitoneal adenopathy. There is no ascites.      IMPRESSION:      Cholelithiasis.  No choledocholithiasis or biliary ductal dilatation.    < end of copied text >      Impression: 53y Female with abdominal pain, cholecystitis acute cholecystitis and choledocholithiasis ruled out, abdominal pain resolved.    Plan:  -cont regular diet  -Ambulate  -d/c planning  -will discuss with surgical attending

## 2019-11-09 NOTE — DISCHARGE NOTE NURSING/CASE MANAGEMENT/SOCIAL WORK - PATIENT PORTAL LINK FT
You can access the FollowMyHealth Patient Portal offered by [facility] by registering at the following website: [email or web address]. By joining [employer]’s FollowMyHealth portal, you will also be able to view your health information using other applications (apps) compatible with our system.

## 2019-11-09 NOTE — DISCHARGE NOTE PROVIDER - CARE PROVIDERS DIRECT ADDRESSES
,DirectAddress_Unknown,[email or web address]

## 2019-11-09 NOTE — DISCHARGE NOTE PROVIDER - NSDCCPGOAL_GEN_ALL_CORE_FT
Follow up in 7-10 days. Please call the office to make an appointment. Activity as tolerated. Rest as needed. You may eat a low fat diet. You may take over the counter stool softeners as needed. Call for any fever over 101, nausea, vomiting, severe pain, no passing of gas or bowel movement.

## 2019-11-09 NOTE — DISCHARGE NOTE PROVIDER - CARE PROVIDER_API CALL
Monty Burden (MD)  Surgery  [address] [place] [address]  Phone: [phone]  Follow Up Time:     Elvis Nair)  Medicine  [address], [address]  [place] [address]  Phone: [phone]  Fax: [phone]  Follow Up Time:

## 2019-11-10 ENCOUNTER — INPATIENT (INPATIENT)
Facility: HOSPITAL | Age: 54
LOS: 10 days | Discharge: HOME | End: 2019-11-21
Attending: STUDENT IN AN ORGANIZED HEALTH CARE EDUCATION/TRAINING PROGRAM | Admitting: STUDENT IN AN ORGANIZED HEALTH CARE EDUCATION/TRAINING PROGRAM
Payer: COMMERCIAL

## 2019-11-10 VITALS
SYSTOLIC BLOOD PRESSURE: 79 MMHG | RESPIRATION RATE: 16 BRPM | HEART RATE: 62 BPM | TEMPERATURE: 97 F | WEIGHT: 167.99 LBS | DIASTOLIC BLOOD PRESSURE: 44 MMHG | OXYGEN SATURATION: 100 % | HEIGHT: 65 IN

## 2019-11-10 DIAGNOSIS — Z31.0 ENCOUNTER FOR REVERSAL OF PREVIOUS STERILIZATION: Chronic | ICD-10-CM

## 2019-11-10 DIAGNOSIS — K85.10 BILIARY ACUTE PANCREATITIS WITHOUT NECROSIS OR INFECTION: ICD-10-CM

## 2019-11-10 PROBLEM — Z78.9 OTHER SPECIFIED HEALTH STATUS: Chronic | Status: ACTIVE | Noted: 2019-11-07

## 2019-11-10 LAB
ALBUMIN SERPL ELPH-MCNC: 4 G/DL — SIGNIFICANT CHANGE UP (ref 3.3–5)
ALP SERPL-CCNC: 159 U/L — HIGH (ref 40–120)
ALT FLD-CCNC: 228 U/L — HIGH (ref 12–78)
ANION GAP SERPL CALC-SCNC: 8 MMOL/L — SIGNIFICANT CHANGE UP (ref 5–17)
AST SERPL-CCNC: 223 U/L — HIGH (ref 15–37)
BASOPHILS # BLD AUTO: 0 K/UL — SIGNIFICANT CHANGE UP (ref 0–0.2)
BASOPHILS NFR BLD AUTO: 0 % — SIGNIFICANT CHANGE UP (ref 0–2)
BILIRUB SERPL-MCNC: 0.9 MG/DL — SIGNIFICANT CHANGE UP (ref 0.2–1.2)
BUN SERPL-MCNC: 14 MG/DL — SIGNIFICANT CHANGE UP (ref 7–23)
CALCIUM SERPL-MCNC: 10.1 MG/DL — SIGNIFICANT CHANGE UP (ref 8.5–10.1)
CHLORIDE SERPL-SCNC: 112 MMOL/L — HIGH (ref 96–108)
CO2 SERPL-SCNC: 25 MMOL/L — SIGNIFICANT CHANGE UP (ref 22–31)
CREAT SERPL-MCNC: 0.95 MG/DL — SIGNIFICANT CHANGE UP (ref 0.5–1.3)
EOSINOPHIL # BLD AUTO: 0 K/UL — SIGNIFICANT CHANGE UP (ref 0–0.5)
EOSINOPHIL NFR BLD AUTO: 0 % — SIGNIFICANT CHANGE UP (ref 0–6)
GLUCOSE SERPL-MCNC: 118 MG/DL — HIGH (ref 70–99)
HCG SERPL-ACNC: 1 MIU/ML — SIGNIFICANT CHANGE UP
HCT VFR BLD CALC: 48.4 % — HIGH (ref 34.5–45)
HGB BLD-MCNC: 15.9 G/DL — HIGH (ref 11.5–15.5)
LACTATE SERPL-SCNC: 2 MMOL/L — SIGNIFICANT CHANGE UP (ref 0.7–2)
LIDOCAIN IGE QN: HIGH U/L (ref 73–393)
LYMPHOCYTES # BLD AUTO: 29 % — SIGNIFICANT CHANGE UP (ref 13–44)
LYMPHOCYTES # BLD AUTO: 6.06 K/UL — HIGH (ref 1–3.3)
MANUAL SMEAR VERIFICATION: SIGNIFICANT CHANGE UP
MCHC RBC-ENTMCNC: 26.9 PG — LOW (ref 27–34)
MCHC RBC-ENTMCNC: 32.9 GM/DL — SIGNIFICANT CHANGE UP (ref 32–36)
MCV RBC AUTO: 81.8 FL — SIGNIFICANT CHANGE UP (ref 80–100)
MONOCYTES # BLD AUTO: 1.88 K/UL — HIGH (ref 0–0.9)
MONOCYTES NFR BLD AUTO: 9 % — SIGNIFICANT CHANGE UP (ref 2–14)
NEUTROPHILS # BLD AUTO: 12.96 K/UL — HIGH (ref 1.8–7.4)
NEUTROPHILS NFR BLD AUTO: 62 % — SIGNIFICANT CHANGE UP (ref 43–77)
NRBC # BLD: 0 /100 — SIGNIFICANT CHANGE UP (ref 0–0)
NRBC # BLD: SIGNIFICANT CHANGE UP /100 WBCS (ref 0–0)
PLAT MORPH BLD: NORMAL — SIGNIFICANT CHANGE UP
PLATELET # BLD AUTO: 253 K/UL — SIGNIFICANT CHANGE UP (ref 150–400)
PLATELET CLUMP BLD QL SMEAR: ABNORMAL
PLATELET COUNT - ESTIMATE: NORMAL — SIGNIFICANT CHANGE UP
POTASSIUM SERPL-MCNC: 3.5 MMOL/L — SIGNIFICANT CHANGE UP (ref 3.5–5.3)
POTASSIUM SERPL-SCNC: 3.5 MMOL/L — SIGNIFICANT CHANGE UP (ref 3.5–5.3)
PROT SERPL-MCNC: 8 GM/DL — SIGNIFICANT CHANGE UP (ref 6–8.3)
RBC # BLD: 5.92 M/UL — HIGH (ref 3.8–5.2)
RBC # FLD: 13.7 % — SIGNIFICANT CHANGE UP (ref 10.3–14.5)
RBC BLD AUTO: NORMAL — SIGNIFICANT CHANGE UP
SMUDGE CELLS # BLD: PRESENT — SIGNIFICANT CHANGE UP
SODIUM SERPL-SCNC: 145 MMOL/L — SIGNIFICANT CHANGE UP (ref 135–145)
WBC # BLD: 20.9 K/UL — HIGH (ref 3.8–10.5)
WBC # FLD AUTO: 20.9 K/UL — HIGH (ref 3.8–10.5)

## 2019-11-10 PROCEDURE — 99285 EMERGENCY DEPT VISIT HI MDM: CPT

## 2019-11-10 PROCEDURE — 76700 US EXAM ABDOM COMPLETE: CPT | Mod: 26

## 2019-11-10 PROCEDURE — 99221 1ST HOSP IP/OBS SF/LOW 40: CPT

## 2019-11-10 RX ORDER — SODIUM CHLORIDE 9 MG/ML
1000 INJECTION INTRAMUSCULAR; INTRAVENOUS; SUBCUTANEOUS ONCE
Refills: 0 | Status: COMPLETED | OUTPATIENT
Start: 2019-11-10 | End: 2019-11-10

## 2019-11-10 RX ORDER — MORPHINE SULFATE 50 MG/1
2 CAPSULE, EXTENDED RELEASE ORAL EVERY 4 HOURS
Refills: 0 | Status: DISCONTINUED | OUTPATIENT
Start: 2019-11-10 | End: 2019-11-13

## 2019-11-10 RX ORDER — MORPHINE SULFATE 50 MG/1
4 CAPSULE, EXTENDED RELEASE ORAL EVERY 4 HOURS
Refills: 0 | Status: DISCONTINUED | OUTPATIENT
Start: 2019-11-10 | End: 2019-11-11

## 2019-11-10 RX ORDER — METOCLOPRAMIDE HCL 10 MG
10 TABLET ORAL ONCE
Refills: 0 | Status: COMPLETED | OUTPATIENT
Start: 2019-11-10 | End: 2019-11-10

## 2019-11-10 RX ORDER — MORPHINE SULFATE 50 MG/1
4 CAPSULE, EXTENDED RELEASE ORAL ONCE
Refills: 0 | Status: DISCONTINUED | OUTPATIENT
Start: 2019-11-10 | End: 2019-11-10

## 2019-11-10 RX ORDER — ONDANSETRON 8 MG/1
4 TABLET, FILM COATED ORAL ONCE
Refills: 0 | Status: COMPLETED | OUTPATIENT
Start: 2019-11-10 | End: 2019-11-10

## 2019-11-10 RX ORDER — ACETAMINOPHEN 500 MG
650 TABLET ORAL EVERY 6 HOURS
Refills: 0 | Status: DISCONTINUED | OUTPATIENT
Start: 2019-11-10 | End: 2019-11-14

## 2019-11-10 RX ORDER — HEPARIN SODIUM 5000 [USP'U]/ML
5000 INJECTION INTRAVENOUS; SUBCUTANEOUS EVERY 12 HOURS
Refills: 0 | Status: DISCONTINUED | OUTPATIENT
Start: 2019-11-10 | End: 2019-11-20

## 2019-11-10 RX ORDER — ONDANSETRON 8 MG/1
4 TABLET, FILM COATED ORAL ONCE
Refills: 0 | Status: DISCONTINUED | OUTPATIENT
Start: 2019-11-10 | End: 2019-11-20

## 2019-11-10 RX ORDER — PIPERACILLIN AND TAZOBACTAM 4; .5 G/20ML; G/20ML
3.38 INJECTION, POWDER, LYOPHILIZED, FOR SOLUTION INTRAVENOUS ONCE
Refills: 0 | Status: COMPLETED | OUTPATIENT
Start: 2019-11-10 | End: 2019-11-10

## 2019-11-10 RX ORDER — SODIUM CHLORIDE 9 MG/ML
1000 INJECTION, SOLUTION INTRAVENOUS
Refills: 0 | Status: DISCONTINUED | OUTPATIENT
Start: 2019-11-10 | End: 2019-11-11

## 2019-11-10 RX ADMIN — SODIUM CHLORIDE 1000 MILLILITER(S): 9 INJECTION INTRAMUSCULAR; INTRAVENOUS; SUBCUTANEOUS at 12:57

## 2019-11-10 RX ADMIN — Medication 10 MILLIGRAM(S): at 15:51

## 2019-11-10 RX ADMIN — MORPHINE SULFATE 4 MILLIGRAM(S): 50 CAPSULE, EXTENDED RELEASE ORAL at 15:47

## 2019-11-10 RX ADMIN — SODIUM CHLORIDE 150 MILLILITER(S): 9 INJECTION, SOLUTION INTRAVENOUS at 19:59

## 2019-11-10 RX ADMIN — ONDANSETRON 4 MILLIGRAM(S): 8 TABLET, FILM COATED ORAL at 12:57

## 2019-11-10 RX ADMIN — MORPHINE SULFATE 4 MILLIGRAM(S): 50 CAPSULE, EXTENDED RELEASE ORAL at 12:58

## 2019-11-10 RX ADMIN — SODIUM CHLORIDE 1000 MILLILITER(S): 9 INJECTION INTRAMUSCULAR; INTRAVENOUS; SUBCUTANEOUS at 13:57

## 2019-11-10 RX ADMIN — PIPERACILLIN AND TAZOBACTAM 200 GRAM(S): 4; .5 INJECTION, POWDER, LYOPHILIZED, FOR SOLUTION INTRAVENOUS at 19:56

## 2019-11-10 RX ADMIN — SODIUM CHLORIDE 1000 MILLILITER(S): 9 INJECTION INTRAMUSCULAR; INTRAVENOUS; SUBCUTANEOUS at 15:51

## 2019-11-10 RX ADMIN — MORPHINE SULFATE 4 MILLIGRAM(S): 50 CAPSULE, EXTENDED RELEASE ORAL at 15:31

## 2019-11-10 RX ADMIN — MORPHINE SULFATE 4 MILLIGRAM(S): 50 CAPSULE, EXTENDED RELEASE ORAL at 13:56

## 2019-11-10 RX ADMIN — SODIUM CHLORIDE 1000 MILLILITER(S): 9 INJECTION INTRAMUSCULAR; INTRAVENOUS; SUBCUTANEOUS at 12:58

## 2019-11-10 RX ADMIN — HEPARIN SODIUM 5000 UNIT(S): 5000 INJECTION INTRAVENOUS; SUBCUTANEOUS at 20:07

## 2019-11-10 RX ADMIN — MORPHINE SULFATE 2 MILLIGRAM(S): 50 CAPSULE, EXTENDED RELEASE ORAL at 20:07

## 2019-11-10 RX ADMIN — SODIUM CHLORIDE 1000 MILLILITER(S): 9 INJECTION INTRAMUSCULAR; INTRAVENOUS; SUBCUTANEOUS at 11:35

## 2019-11-10 NOTE — H&P ADULT - ATTENDING COMMENTS
Patient now presenting with evidence of gallstone pancreatitis given lipase >30K, epigastric pain. Will provide supportive care with plans for laparoscopic, possible open, cholecystectomy following resolution. I spoke with the patient regarding this plan, and she was agreeable to it, and all her questions were answered.

## 2019-11-10 NOTE — ED PROVIDER NOTE - OBJECTIVE STATEMENT
Pt is a 53 year old female with PMHx who presents to the ED for abdominal pain. She c/o upper left abdominal pain starting x4 days ago and was evaluated by her PMD who told the pt she had cholecystitis. PMD obtained an US which showed cholelithiasis. Her PMD only recommended bactrim for a UTI and did not give any recommendations for her cholecystitis. She notes that her pain is worse today than yesterday which was exacerbated after eating cookies and an orange this morning. She has had bilious vomit in the ED, mild SOB, and chills. She denies any fever, chest pain, back pain, dysuria, increased urinary frequency, cough, recent falls or trauma. She repots a tubal ligation 17 years ago and right heel spur repair. Patient denies EtOH/tobacco/illicit substance use. She denies any recent travel or sick contacts. 53 year old female with PMHx who presents to the ED for abdominal pain. She c/o upper left abdominal pain starting x4 days ago and was evaluated by her PMD who told the pt she had cholecystitis. PMD obtained an US which showed cholelithiasis. Her PMD only recommended bactrim for a UTI and did not give any recommendations for her cholecystitis. She notes that her pain is worse today than yesterday which was exacerbated after eating cookies and an orange this morning. She has had bilious vomit in the ED, mild SOB, and chills. She denies any fever, chest pain, back pain, dysuria, increased urinary frequency, cough, recent falls or trauma. She repots a tubal ligation 17 years ago and right heel spur repair. Patient denies EtOH/tobacco/illicit substance use. She denies any recent travel or sick contacts.

## 2019-11-10 NOTE — ED ADULT NURSE NOTE - ED STAT RN HANDOFF DETAILS
report given to er hold farnaz yoon admitted med/surg npo maintained pt aware lr/abx infusing without difficulty noted awaiting bed availability

## 2019-11-10 NOTE — H&P ADULT - ASSESSMENT
52 y/o female discharged yesterday from surgery's service after being conservatively managed for RUQ pain with findings of cholelithiasis with negative HIDA and MRCP, pt returned to ED this morning with 9/10 crampy intermittent abdominal pain now to the LUQ radiating to the LLQ, lipase > 30,000, liekly gallstone pancreatitis.

## 2019-11-10 NOTE — ED PROVIDER NOTE - PHYSICAL EXAMINATION
Gen: no acute distress, well appearing, awake, alert and oriented x 3  Cardiac: regular rate and rhythm, +S1S2  Pulm: Clear to auscultation bilaterally  Abd: soft, +murphys, nondistended, no guarding  Back: neg CVA ttp, nontender spine  Extremity: no edema, no deformity, warm and well perfused, FROM all extremities    Neuro: awake, alert, oriented x 3, sensorimotor intact

## 2019-11-10 NOTE — ED ADULT TRIAGE NOTE - CHIEF COMPLAINT QUOTE
Left flank pain, nausea and vomiting, , active vomiting at triage, Seen this week in ED for Gall Stones as per patient, diaphoretic

## 2019-11-10 NOTE — H&P ADULT - PROBLEM SELECTOR PLAN 1
-Admit to Dr. Burden  -IVFs, castro catheter  -Needs GI consult  -Pain management   -serial abdominal exams  -NPO  -trend lipase, LFTs

## 2019-11-10 NOTE — ED ADULT NURSE NOTE - OBJECTIVE STATEMENT
Left flank pain, nausea and vomiting that started this morning, , active vomiting at triage, Seen this week in ED Thursday  for Gall Stones  and discharged yesterday from [facility] as per patient, diaphoretic.  Denies fever, dysuria

## 2019-11-10 NOTE — H&P ADULT - HISTORY OF PRESENT ILLNESS
52 y/o female discharged yesterday from surgery's service after being conservatively managed for RUQ pain with findings of cholelithiasis without signs of acute cholecystitis, returned to ED this morning with 9/10 crampy intermittent abdominal pain now to the LUQ radiating to the LLQ.  Pt's reports no inciting or alleviating factors.  She was sitting at home last evening eating cookies, tea, and an orange when the pain suddenly began.  Pt reports last BM was this morning at 8 am and was normal.  Denies nausea or vomiting.  Pt denies fever, chills, shortness of breath, chest pain.

## 2019-11-10 NOTE — ED PROVIDER NOTE - NS ED ROS FT
Constitutional: no fevers, + chills  Cardiac: no palpitations, chest pain  Lungs: + mild shortness of breath, no wheezes  Abd: + LUQ abd pain, nausea, vomiting, no diarrhea  Genitourinary: no dysuria, increased urinary frequency, hematuria  Neurology: no sensorimotor deficits, no dizziness, no headache, no visual changes  Skin: no rashes  All other ROS negative except as per HPI

## 2019-11-10 NOTE — H&P ADULT - NSHPPHYSICALEXAM_GEN_ALL_CORE
General: NAD  Head: NCAT   Pulm: nonlabored respirations   Abdomen: soft, non-distended, +LUQ and epigastric tenderness, mild pain elicited on palpation of RUQ  Extremities: no leg swelling or edema

## 2019-11-10 NOTE — ED PROVIDER NOTE - CLINICAL SUMMARY MEDICAL DECISION MAKING FREE TEXT BOX
Female presents to ED for evaluation of abd pain - Will check labs, imaging, medicate, consult surgery

## 2019-11-11 DIAGNOSIS — K85.90 ACUTE PANCREATITIS WITHOUT NECROSIS OR INFECTION, UNSPECIFIED: ICD-10-CM

## 2019-11-11 LAB
ALBUMIN SERPL ELPH-MCNC: 2.8 G/DL — LOW (ref 3.3–5)
ALP SERPL-CCNC: 113 U/L — SIGNIFICANT CHANGE UP (ref 40–120)
ALT FLD-CCNC: 132 U/L — HIGH (ref 12–78)
AMYLASE P1 CFR SERPL: 1086 U/L — HIGH (ref 25–115)
ANION GAP SERPL CALC-SCNC: 5 MMOL/L — SIGNIFICANT CHANGE UP (ref 5–17)
ANISOCYTOSIS BLD QL: SLIGHT — SIGNIFICANT CHANGE UP
APPEARANCE UR: CLEAR — SIGNIFICANT CHANGE UP
AST SERPL-CCNC: 53 U/L — HIGH (ref 15–37)
BACTERIA # UR AUTO: ABNORMAL
BASOPHILS # BLD AUTO: 0 K/UL — SIGNIFICANT CHANGE UP (ref 0–0.2)
BASOPHILS NFR BLD AUTO: 0 % — SIGNIFICANT CHANGE UP (ref 0–2)
BILIRUB DIRECT SERPL-MCNC: 0.19 MG/DL — SIGNIFICANT CHANGE UP (ref 0.05–0.2)
BILIRUB INDIRECT FLD-MCNC: 0.4 MG/DL — SIGNIFICANT CHANGE UP (ref 0.2–1)
BILIRUB SERPL-MCNC: 0.6 MG/DL — SIGNIFICANT CHANGE UP (ref 0.2–1.2)
BILIRUB UR-MCNC: NEGATIVE — SIGNIFICANT CHANGE UP
BUN SERPL-MCNC: 9 MG/DL — SIGNIFICANT CHANGE UP (ref 7–23)
CALCIUM SERPL-MCNC: 9 MG/DL — SIGNIFICANT CHANGE UP (ref 8.5–10.1)
CHLORIDE SERPL-SCNC: 112 MMOL/L — HIGH (ref 96–108)
CO2 SERPL-SCNC: 24 MMOL/L — SIGNIFICANT CHANGE UP (ref 22–31)
COLOR SPEC: YELLOW — SIGNIFICANT CHANGE UP
CREAT SERPL-MCNC: 0.6 MG/DL — SIGNIFICANT CHANGE UP (ref 0.5–1.3)
DIFF PNL FLD: NEGATIVE — SIGNIFICANT CHANGE UP
EOSINOPHIL # BLD AUTO: 0 K/UL — SIGNIFICANT CHANGE UP (ref 0–0.5)
EOSINOPHIL NFR BLD AUTO: 0 % — SIGNIFICANT CHANGE UP (ref 0–6)
EPI CELLS # UR: SIGNIFICANT CHANGE UP
GLUCOSE SERPL-MCNC: 86 MG/DL — SIGNIFICANT CHANGE UP (ref 70–99)
GLUCOSE UR QL: NEGATIVE MG/DL — SIGNIFICANT CHANGE UP
HCT VFR BLD CALC: 42.9 % — SIGNIFICANT CHANGE UP (ref 34.5–45)
HGB BLD-MCNC: 14.2 G/DL — SIGNIFICANT CHANGE UP (ref 11.5–15.5)
KETONES UR-MCNC: ABNORMAL
LEUKOCYTE ESTERASE UR-ACNC: ABNORMAL
LIDOCAIN IGE QN: 8212 U/L — HIGH (ref 73–393)
LYMPHOCYTES # BLD AUTO: 0.8 K/UL — LOW (ref 1–3.3)
LYMPHOCYTES # BLD AUTO: 8 % — LOW (ref 13–44)
MAGNESIUM SERPL-MCNC: 1.7 MG/DL — SIGNIFICANT CHANGE UP (ref 1.6–2.6)
MANUAL SMEAR VERIFICATION: SIGNIFICANT CHANGE UP
MCHC RBC-ENTMCNC: 26.7 PG — LOW (ref 27–34)
MCHC RBC-ENTMCNC: 33.1 GM/DL — SIGNIFICANT CHANGE UP (ref 32–36)
MCV RBC AUTO: 80.8 FL — SIGNIFICANT CHANGE UP (ref 80–100)
MONOCYTES # BLD AUTO: 0.3 K/UL — SIGNIFICANT CHANGE UP (ref 0–0.9)
MONOCYTES NFR BLD AUTO: 3 % — SIGNIFICANT CHANGE UP (ref 2–14)
NEUTROPHILS # BLD AUTO: 8.75 K/UL — HIGH (ref 1.8–7.4)
NEUTROPHILS NFR BLD AUTO: 86 % — HIGH (ref 43–77)
NEUTS BAND # BLD: 2 % — SIGNIFICANT CHANGE UP (ref 0–8)
NITRITE UR-MCNC: NEGATIVE — SIGNIFICANT CHANGE UP
NRBC # BLD: 0 /100 — SIGNIFICANT CHANGE UP (ref 0–0)
NRBC # BLD: SIGNIFICANT CHANGE UP /100 WBCS (ref 0–0)
PH UR: 5 — SIGNIFICANT CHANGE UP (ref 5–8)
PHOSPHATE SERPL-MCNC: 3.1 MG/DL — SIGNIFICANT CHANGE UP (ref 2.5–4.5)
PLAT MORPH BLD: NORMAL — SIGNIFICANT CHANGE UP
PLATELET # BLD AUTO: 182 K/UL — SIGNIFICANT CHANGE UP (ref 150–400)
POTASSIUM SERPL-MCNC: 3.4 MMOL/L — LOW (ref 3.5–5.3)
POTASSIUM SERPL-SCNC: 3.4 MMOL/L — LOW (ref 3.5–5.3)
PROT SERPL-MCNC: 6 GM/DL — SIGNIFICANT CHANGE UP (ref 6–8.3)
PROT UR-MCNC: 15 MG/DL
RBC # BLD: 5.31 M/UL — HIGH (ref 3.8–5.2)
RBC # FLD: 13.7 % — SIGNIFICANT CHANGE UP (ref 10.3–14.5)
RBC BLD AUTO: ABNORMAL
RBC CASTS # UR COMP ASSIST: SIGNIFICANT CHANGE UP /HPF (ref 0–4)
SMUDGE CELLS # BLD: PRESENT — SIGNIFICANT CHANGE UP
SODIUM SERPL-SCNC: 141 MMOL/L — SIGNIFICANT CHANGE UP (ref 135–145)
SP GR SPEC: 1.02 — SIGNIFICANT CHANGE UP (ref 1.01–1.02)
UROBILINOGEN FLD QL: NEGATIVE MG/DL — SIGNIFICANT CHANGE UP
VARIANT LYMPHS # BLD: 1 % — SIGNIFICANT CHANGE UP (ref 0–6)
WBC # BLD: 9.94 K/UL — SIGNIFICANT CHANGE UP (ref 3.8–10.5)
WBC # FLD AUTO: 9.94 K/UL — SIGNIFICANT CHANGE UP (ref 3.8–10.5)
WBC UR QL: ABNORMAL

## 2019-11-11 PROCEDURE — 99231 SBSQ HOSP IP/OBS SF/LOW 25: CPT

## 2019-11-11 RX ORDER — DEXTROSE MONOHYDRATE, SODIUM CHLORIDE, AND POTASSIUM CHLORIDE 50; .745; 4.5 G/1000ML; G/1000ML; G/1000ML
1000 INJECTION, SOLUTION INTRAVENOUS
Refills: 0 | Status: DISCONTINUED | OUTPATIENT
Start: 2019-11-11 | End: 2019-11-20

## 2019-11-11 RX ORDER — ONDANSETRON 8 MG/1
4 TABLET, FILM COATED ORAL EVERY 6 HOURS
Refills: 0 | Status: DISCONTINUED | OUTPATIENT
Start: 2019-11-11 | End: 2019-11-20

## 2019-11-11 RX ADMIN — MORPHINE SULFATE 2 MILLIGRAM(S): 50 CAPSULE, EXTENDED RELEASE ORAL at 22:26

## 2019-11-11 RX ADMIN — MORPHINE SULFATE 2 MILLIGRAM(S): 50 CAPSULE, EXTENDED RELEASE ORAL at 02:26

## 2019-11-11 RX ADMIN — MORPHINE SULFATE 4 MILLIGRAM(S): 50 CAPSULE, EXTENDED RELEASE ORAL at 07:44

## 2019-11-11 RX ADMIN — SODIUM CHLORIDE 150 MILLILITER(S): 9 INJECTION, SOLUTION INTRAVENOUS at 05:13

## 2019-11-11 RX ADMIN — MORPHINE SULFATE 2 MILLIGRAM(S): 50 CAPSULE, EXTENDED RELEASE ORAL at 01:59

## 2019-11-11 RX ADMIN — DEXTROSE MONOHYDRATE, SODIUM CHLORIDE, AND POTASSIUM CHLORIDE 150 MILLILITER(S): 50; .745; 4.5 INJECTION, SOLUTION INTRAVENOUS at 09:26

## 2019-11-11 RX ADMIN — HEPARIN SODIUM 5000 UNIT(S): 5000 INJECTION INTRAVENOUS; SUBCUTANEOUS at 05:12

## 2019-11-11 RX ADMIN — MORPHINE SULFATE 2 MILLIGRAM(S): 50 CAPSULE, EXTENDED RELEASE ORAL at 13:46

## 2019-11-11 RX ADMIN — MORPHINE SULFATE 2 MILLIGRAM(S): 50 CAPSULE, EXTENDED RELEASE ORAL at 14:01

## 2019-11-11 RX ADMIN — MORPHINE SULFATE 4 MILLIGRAM(S): 50 CAPSULE, EXTENDED RELEASE ORAL at 07:59

## 2019-11-11 RX ADMIN — HEPARIN SODIUM 5000 UNIT(S): 5000 INJECTION INTRAVENOUS; SUBCUTANEOUS at 17:00

## 2019-11-11 RX ADMIN — MORPHINE SULFATE 2 MILLIGRAM(S): 50 CAPSULE, EXTENDED RELEASE ORAL at 21:56

## 2019-11-11 NOTE — CONSULT NOTE ADULT - SUBJECTIVE AND OBJECTIVE BOX
Chief Complaint:  Patient is a 53y old  Female who presents with a chief complaint of gallstone pancreatitis (2019 08:46)      HPI:  52 y/o female discharged yesterday from surgery's service after being conservatively managed for RUQ pain with findings of cholelithiasis without signs of acute cholecystitis, MRCP was negative as well. Returned to ED this morning with 9/10 crampy intermittent abdominal pain now to the LUQ radiating to the LLQ.  Pt's reports no inciting or alleviating factors.  She was sitting at home last evening eating cookies, tea, and an orange when the pain suddenly began.  Pt reports last BM was this morning at 8 am and was normal.  Denies nausea or vomiting.  Pt denies fever, chills, shortness of breath, chest pain. (10 Nov 2019 19:44)  We were asked to evaluate patient for above     PMH/PSH:PAST MEDICAL & SURGICAL HISTORY:  No pertinent past medical history  H/O post-sterilization tuboplasty  Last colonoscopy ~, normal per patient    Allergies:  No Known Allergies      Medications:  acetaminophen   Tablet .. 650 milliGRAM(s) Oral every 6 hours PRN  dextrose 5% + sodium chloride 0.45% with potassium chloride 20 mEq/L 1000 milliLiter(s) IV Continuous <Continuous>  heparin  Injectable 5000 Unit(s) SubCutaneous every 12 hours  morphine  - Injectable 2 milliGRAM(s) IV Push every 4 hours PRN  morphine  - Injectable 4 milliGRAM(s) IV Push every 4 hours PRN  ondansetron Injectable 4 milliGRAM(s) IV Push once PRN      Review of Systems:    General:  No weight loss, fevers, chills, night sweats, fatigue,   Eyes:  Good vision, no reported pain  ENT:  No sore throat, pain, runny nose, dysphagia  CV:  No pain, palpitations, hypo/hypertension  Resp:  No dyspnea, cough, tachypnea, wheezing  GI:  No pain, No nausea, No vomiting, No diarrhea, No constipation, No pruritis, No rectal bleeding, No tarry stools, No dysphagia,  :  No pain, bleeding, incontinence, nocturia  Muscle:  No pain, weakness  Breast:  No pain, abscess, mass, discharge  Neuro:  No weakness, tingling, memory problems  Psych:  No fatigue, insomnia, mood problems, depression  Endocrine:  No polyuria, polydipsia, cold/heat intolerance  Heme:  No petechiae, ecchymosis, easy bruisability  Skin:  No rash, tattoos, scars, edema    Relevant Family History:   FAMILY HISTORY:  Family history of diabetes mellitus (DM)  FH: HTN (hypertension)      Relevant Social History: Alcohol ( -) , Tobacco ( -) , Illicit drugs (- )     Physical Exam:    Vital Signs:  Vital Signs Last 24 Hrs  T(C): 37.3 (2019 06:52), Max: 37.3 (2019 06:52)  T(F): 99.2 (2019 06:52), Max: 99.2 (2019 06:52)  HR: 84 (2019 06:52) (67 - 84)  BP: 128/78 (2019 06:52) (105/51 - 137/84)  BP(mean): --  RR: 18 (2019 06:52) (15 - 18)  SpO2: 98% (2019 06:52) (96% - 98%)  Daily     Daily Weight in k.6 (2019 03:00)    General:  Appears stated age, well-groomed, well-nourished, no distress  HEENT:  NC/AT,  conjunctivae clear and pink, no thyromegaly, nodules, adenopathy, no JVD, anicteric sclera  Chest:  Full & symmetric excursion, no increased effort, breath sounds clear  Cardiovascular:  Regular rhythm, S1, S2, no murmur/rub/S3/S4, no abdominal bruit, no edema  Abdomen:  Soft, +RUQ tender, non distended, normoactive bowel sounds,  no masses , no hepatosplenomegaly, no signs of chronic liver disease  Extremities:  no cyanosis, clubbing or edema  Skin:  No rash/erythema/ecchymoses/petechiae/wounds/abscess/warm/dry  Neuro/Psych:  Alert, oriented, no asterixis, no tremor, no encephalopathy    Laboratory:                          14.2   9.94  )-----------( 182      ( 2019 06:22 )             42.9         141  |  112<H>  |  9   ----------------------------<  86  3.4<L>   |  24  |  0.60    Ca    9.0      2019 06:22  Phos  3.1       Mg     1.7         TPro  6.0  /  Alb  2.8<L>  /  TBili  0.6  /  DBili  .19  /  AST  53<H>  /  ALT  132<H>  /  AlkPhos  113      LIVER FUNCTIONS - ( 2019 06:22 )  Alb: 2.8 g/dL / Pro: 6.0 gm/dL / ALK PHOS: 113 U/L / ALT: 132 U/L / AST: 53 U/L / GGT: x             Urinalysis Basic - ( 2019 03:01 )    Color: Yellow / Appearance: Clear / S.025 / pH: x  Gluc: x / Ketone: Trace  / Bili: Negative / Urobili: Negative mg/dL   Blood: x / Protein: 15 mg/dL / Nitrite: Negative   Leuk Esterase: Trace / RBC: 0-2 /HPF / WBC 11-25   Sq Epi: x / Non Sq Epi: Few / Bacteria: Few      Amylase [id number] U/L<H>      Lipase serum >30,000 ----8212 U/L<H>     Intake and Output    11-10-19 @ 07:01  -  19 @ 07:00  --------------------------------------------------------  IN: 400 mL / OUT: 250 mL / NET: 150 mL    19 @ 07:01  -  19 @ 10:37  --------------------------------------------------------  IN: 0 mL / OUT: 250 mL / NET: -250 mL        Imaging:  EXAM:  US ABDOMINAL COMPLETE                        PROCEDURE DATE:  11/10/2019    INTERPRETATION:  CLINICAL INFORMATION: Right upper quadrant pain    COMPARISON: None available.    TECHNIQUE: Sonography of the abdomen.     FINDINGS:    Liver: Within normal limits.    Bile ducts: Normal caliber. Common bile duct measures 7 mm.     Gallbladder: Distended with multiple stones. Mild gallbladder wall   thickening. Sonographic Child's sign could not be assessed because the   patient is premedicated.        Pancreas: Visualized portions are within normal limits.    Spleen: 6.8 cm. Within normal limits.    Right kidney: 11.6 cm. No hydronephrosis.    Left kidney: 10.4 cm.  No hydronephrosis. Small perinephric fluid,   nonspecific.    Ascites: As above.    Aorta and IVC: Visualized portions are within normal limits.    IMPRESSION:     Distended gallbladder with stones and mild wall thickening concerning for   acute cholecystitis. Sonographic Child's sign could not be assessed   because the patient is premedicated.  Consider further evaluation with   HIDA scan.    KYLE RO M.D., ATTENDING RADIOLOGIST  This document has been electronically signed. Nov 10 2019  4:38PM  >      EXAM:  MR MRCP                        PROCEDURE DATE:  2019    INTERPRETATION:  CLINICAL INFORMATION: Dilated CBD, epigastric abdominal   pain    COMPARISON: Ultrasound dated 2019    PROCEDURE:     The following sequences were obtained:  1. Axial LAVA, Dualecho in and out-of-phase, 2D FIESTA, and T2 SSFSE  2. Coronal T2 SSFSE  3. 3D and radial MRCP    FINDINGS:     There are several small hepatic cysts measuring up to 1 cm.  No intra or   extrahepatic biliary dilatation is noted. The CBD measures 7 mm. No   intraductal biliary calculi are identified. The gallbladder is visualized   and contains numerous small stones.     The pancreas demonstrates normal T1 signal without focal lesion. The   pancreatic duct is normal in caliber.     There are renal cysts measuring up to 1 cm. The spleen, adrenal glands,   and kidneys are otherwise unremarkable in appearance.      There is no retroperitoneal adenopathy. There is no ascites.      IMPRESSION:      Cholelithiasis.  No choledocholithiasis or biliary ductal dilatation.      TIA INFANTE M.D., ATTENDING RADIOLOGIST  This document has been electronically signed. 2019  4:10PM

## 2019-11-11 NOTE — PROGRESS NOTE ADULT - SUBJECTIVE AND OBJECTIVE BOX
Patient seen and examined bedside resting comfortably.  Reports left upper abdominal discomfort, 5/10 in severity, relieved by morphine.  Denies nausea and vomiting, fever and chills.  Voiding without difficulty.    T(F): 99.2 (11-11-19 @ 06:52), Max: 99.2 (11-11-19 @ 06:52)  HR: 84 (11-11-19 @ 06:52) (62 - 84)  BP: 128/78 (11-11-19 @ 06:52) (79/44 - 137/84)  RR: 18 (11-11-19 @ 06:52) (15 - 18)  SpO2: 98% (11-11-19 @ 06:52) (96% - 100%)      PHYSICAL EXAM:  General: NAD, WDWN  Neuro:  Alert & oriented x 3  HEENT: NCAT, EOMI, conjunctiva clear  CV: +S1+S2 regular rate and rhythm  Lung: clear to auscultation bilaterally, respirations nonlabored, good inspiratory effort  Abdomen: soft, nondistended. Epigastric and RUQ tenderness to palpation, negative Child's sign. No guarding  Extremities: no pedal edema or calf tenderness noted     LABS:                        14.2   9.94  )-----------( 182      ( 11 Nov 2019 06:22 )             42.9     11-11    141  |  112<H>  |  9   ----------------------------<  86  3.4<L>   |  24  |  0.60    Ca    9.0      11 Nov 2019 06:22  Phos  3.1     11-11  Mg     1.7     11-11    TPro  6.0  /  Alb  2.8<L>  /  TBili  0.6  /  DBili  .19  /  AST  53<H>  /  ALT  132<H>  /  AlkPhos  113  11-11        Culture Results:   <10,000 CFU/mL Normal Urogenital Nataliya (11-08 @ 00:46) Patient seen and examined bedside resting comfortably.  Reports left upper abdominal discomfort, 5/10 in severity, relieved by morphine. Pt states this is improved since yesterday.  Denies nausea and vomiting, fever and chills.  Voiding without difficulty.    T(F): 99.2 (11-11-19 @ 06:52), Max: 99.2 (11-11-19 @ 06:52)  HR: 84 (11-11-19 @ 06:52) (62 - 84)  BP: 128/78 (11-11-19 @ 06:52) (79/44 - 137/84)  RR: 18 (11-11-19 @ 06:52) (15 - 18)  SpO2: 98% (11-11-19 @ 06:52) (96% - 100%)      PHYSICAL EXAM:  General: NAD, WDWN  Neuro:  Alert & oriented x 3  HEENT: NCAT, EOMI, conjunctiva clear  CV: +S1+S2 regular rate and rhythm  Lung: clear to auscultation bilaterally, respirations nonlabored, good inspiratory effort  Abdomen: soft, nondistended. Epigastric and RUQ tenderness to palpation, negative Child's sign. No guarding  Extremities: no pedal edema or calf tenderness noted     LABS:                        14.2   9.94  )-----------( 182      ( 11 Nov 2019 06:22 )             42.9     11-11    141  |  112<H>  |  9   ----------------------------<  86  3.4<L>   |  24  |  0.60    Ca    9.0      11 Nov 2019 06:22  Phos  3.1     11-11  Mg     1.7     11-11    TPro  6.0  /  Alb  2.8<L>  /  TBili  0.6  /  DBili  .19  /  AST  53<H>  /  ALT  132<H>  /  AlkPhos  113  11-11        Culture Results:   <10,000 CFU/mL Normal Urogenital Nataliya (11-08 @ 00:46)      Impression: 54 y/o female admitted with gallstone pancreatitis, with known cholelithiasis, s/p normal HIDA and MRCP with no choledocholithiasis on prior admission last week  Leukocytosis resolved, liver enzymes normalizing  Hypokalemia repleted  Plan:   -continue NPO, IVF, analgesia prn  -GI consult requested  -f/u labs and trend enzymes  -DVT prophylaxis, Incentive Spirometer, OOB, Ambulating, pain control   -Discussed with Dr. Sonia ramires planning. Patient seen and examined bedside resting comfortably.  Reports left upper abdominal discomfort, 5/10 in severity, relieved by morphine. Pt states this is improved since yesterday.  Denies nausea and vomiting, fever and chills.  Voiding without difficulty.    T(F): 99.2 (11-11-19 @ 06:52), Max: 99.2 (11-11-19 @ 06:52)  HR: 84 (11-11-19 @ 06:52) (62 - 84)  BP: 128/78 (11-11-19 @ 06:52) (79/44 - 137/84)  RR: 18 (11-11-19 @ 06:52) (15 - 18)  SpO2: 98% (11-11-19 @ 06:52) (96% - 100%)      PHYSICAL EXAM:  General: NAD, WDWN  Neuro:  Alert & oriented x 3  HEENT: NCAT, EOMI, conjunctiva clear  CV: +S1+S2 regular rate and rhythm  Lung: clear to auscultation bilaterally, respirations nonlabored, good inspiratory effort  Abdomen: soft, nondistended. Epigastric and RUQ tenderness to palpation, negative Child's sign. No guarding  Extremities: no pedal edema or calf tenderness noted     LABS:                        14.2   9.94  )-----------( 182      ( 11 Nov 2019 06:22 )             42.9     11-11    141  |  112<H>  |  9   ----------------------------<  86  3.4<L>   |  24  |  0.60    Ca    9.0      11 Nov 2019 06:22  Phos  3.1     11-11  Mg     1.7     11-11    TPro  6.0  /  Alb  2.8<L>  /  TBili  0.6  /  DBili  .19  /  AST  53<H>  /  ALT  132<H>  /  AlkPhos  113  11-11        Culture Results:   <10,000 CFU/mL Normal Urogenital Nataliya (11-08 @ 00:46)      Impression: 54 y/o female admitted with gallstone pancreatitis, sepsis present on admission (hypotensive with leukocytosis)  with known cholelithiasis, s/p normal HIDA and MRCP with no choledocholithiasis on prior admission last week  Leukocytosis now resolved, liver enzymes normalizing  Hypokalemia repleted  Plan:   -continue NPO, IVF, analgesia prn  -GI consult requested  -f/u labs and trend enzymes  -DVT prophylaxis, Incentive Spirometer, OOB, Ambulating, pain control   -Discussed with Dr. Burden. Barbra ramires planning.

## 2019-11-11 NOTE — CONSULT NOTE ADULT - ASSESSMENT
ABDOMINAL PAIN with severe PANCREATITIS of unknown etiology , + gallstones without cholecystitis or pancreatitis  on recent admission few days ago.  MRCP was negative

## 2019-11-12 DIAGNOSIS — N39.0 URINARY TRACT INFECTION, SITE NOT SPECIFIED: ICD-10-CM

## 2019-11-12 DIAGNOSIS — K80.20 CALCULUS OF GALLBLADDER WITHOUT CHOLECYSTITIS WITHOUT OBSTRUCTION: ICD-10-CM

## 2019-11-12 LAB
ALBUMIN SERPL ELPH-MCNC: 2.5 G/DL — LOW (ref 3.3–5)
ALP SERPL-CCNC: 97 U/L — SIGNIFICANT CHANGE UP (ref 40–120)
ALT FLD-CCNC: 74 U/L — SIGNIFICANT CHANGE UP (ref 12–78)
AMYLASE P1 CFR SERPL: 613 U/L — HIGH (ref 25–115)
ANION GAP SERPL CALC-SCNC: 7 MMOL/L — SIGNIFICANT CHANGE UP (ref 5–17)
AST SERPL-CCNC: 24 U/L — SIGNIFICANT CHANGE UP (ref 15–37)
BASOPHILS # BLD AUTO: 0.02 K/UL — SIGNIFICANT CHANGE UP (ref 0–0.2)
BASOPHILS NFR BLD AUTO: 0.2 % — SIGNIFICANT CHANGE UP (ref 0–2)
BILIRUB DIRECT SERPL-MCNC: 0.2 MG/DL — SIGNIFICANT CHANGE UP (ref 0.05–0.2)
BILIRUB INDIRECT FLD-MCNC: 0.8 MG/DL — SIGNIFICANT CHANGE UP (ref 0.2–1)
BILIRUB SERPL-MCNC: 1 MG/DL — SIGNIFICANT CHANGE UP (ref 0.2–1.2)
BUN SERPL-MCNC: 4 MG/DL — LOW (ref 7–23)
CALCIUM SERPL-MCNC: 8.9 MG/DL — SIGNIFICANT CHANGE UP (ref 8.5–10.1)
CHLORIDE SERPL-SCNC: 106 MMOL/L — SIGNIFICANT CHANGE UP (ref 96–108)
CO2 SERPL-SCNC: 24 MMOL/L — SIGNIFICANT CHANGE UP (ref 22–31)
CREAT SERPL-MCNC: 0.52 MG/DL — SIGNIFICANT CHANGE UP (ref 0.5–1.3)
CULTURE RESULTS: NO GROWTH — SIGNIFICANT CHANGE UP
EOSINOPHIL # BLD AUTO: 0.02 K/UL — SIGNIFICANT CHANGE UP (ref 0–0.5)
EOSINOPHIL NFR BLD AUTO: 0.2 % — SIGNIFICANT CHANGE UP (ref 0–6)
GLUCOSE SERPL-MCNC: 86 MG/DL — SIGNIFICANT CHANGE UP (ref 70–99)
HCT VFR BLD CALC: 40.6 % — SIGNIFICANT CHANGE UP (ref 34.5–45)
HGB BLD-MCNC: 13.6 G/DL — SIGNIFICANT CHANGE UP (ref 11.5–15.5)
IMM GRANULOCYTES NFR BLD AUTO: 0.3 % — SIGNIFICANT CHANGE UP (ref 0–1.5)
LIDOCAIN IGE QN: 2496 U/L — HIGH (ref 73–393)
LYMPHOCYTES # BLD AUTO: 17.3 % — SIGNIFICANT CHANGE UP (ref 13–44)
LYMPHOCYTES # BLD AUTO: 2.02 K/UL — SIGNIFICANT CHANGE UP (ref 1–3.3)
MCHC RBC-ENTMCNC: 27.2 PG — SIGNIFICANT CHANGE UP (ref 27–34)
MCHC RBC-ENTMCNC: 33.5 GM/DL — SIGNIFICANT CHANGE UP (ref 32–36)
MCV RBC AUTO: 81.2 FL — SIGNIFICANT CHANGE UP (ref 80–100)
MONOCYTES # BLD AUTO: 0.95 K/UL — HIGH (ref 0–0.9)
MONOCYTES NFR BLD AUTO: 8.1 % — SIGNIFICANT CHANGE UP (ref 2–14)
NEUTROPHILS # BLD AUTO: 8.62 K/UL — HIGH (ref 1.8–7.4)
NEUTROPHILS NFR BLD AUTO: 73.9 % — SIGNIFICANT CHANGE UP (ref 43–77)
NRBC # BLD: 0 /100 WBCS — SIGNIFICANT CHANGE UP (ref 0–0)
PLATELET # BLD AUTO: 165 K/UL — SIGNIFICANT CHANGE UP (ref 150–400)
POTASSIUM SERPL-MCNC: 3.4 MMOL/L — LOW (ref 3.5–5.3)
POTASSIUM SERPL-SCNC: 3.4 MMOL/L — LOW (ref 3.5–5.3)
PROT SERPL-MCNC: 5.9 GM/DL — LOW (ref 6–8.3)
RBC # BLD: 5 M/UL — SIGNIFICANT CHANGE UP (ref 3.8–5.2)
RBC # FLD: 13.5 % — SIGNIFICANT CHANGE UP (ref 10.3–14.5)
SODIUM SERPL-SCNC: 137 MMOL/L — SIGNIFICANT CHANGE UP (ref 135–145)
SPECIMEN SOURCE: SIGNIFICANT CHANGE UP
WBC # BLD: 11.67 K/UL — HIGH (ref 3.8–10.5)
WBC # FLD AUTO: 11.67 K/UL — HIGH (ref 3.8–10.5)

## 2019-11-12 PROCEDURE — 99231 SBSQ HOSP IP/OBS SF/LOW 25: CPT

## 2019-11-12 RX ORDER — POTASSIUM CHLORIDE 20 MEQ
10 PACKET (EA) ORAL
Refills: 0 | Status: COMPLETED | OUTPATIENT
Start: 2019-11-12 | End: 2019-11-12

## 2019-11-12 RX ADMIN — Medication 100 MILLIEQUIVALENT(S): at 13:23

## 2019-11-12 RX ADMIN — MORPHINE SULFATE 2 MILLIGRAM(S): 50 CAPSULE, EXTENDED RELEASE ORAL at 15:55

## 2019-11-12 RX ADMIN — DEXTROSE MONOHYDRATE, SODIUM CHLORIDE, AND POTASSIUM CHLORIDE 150 MILLILITER(S): 50; .745; 4.5 INJECTION, SOLUTION INTRAVENOUS at 06:02

## 2019-11-12 RX ADMIN — HEPARIN SODIUM 5000 UNIT(S): 5000 INJECTION INTRAVENOUS; SUBCUTANEOUS at 06:03

## 2019-11-12 RX ADMIN — Medication 100 MILLIEQUIVALENT(S): at 11:39

## 2019-11-12 RX ADMIN — MORPHINE SULFATE 2 MILLIGRAM(S): 50 CAPSULE, EXTENDED RELEASE ORAL at 06:04

## 2019-11-12 RX ADMIN — HEPARIN SODIUM 5000 UNIT(S): 5000 INJECTION INTRAVENOUS; SUBCUTANEOUS at 17:19

## 2019-11-12 RX ADMIN — MORPHINE SULFATE 2 MILLIGRAM(S): 50 CAPSULE, EXTENDED RELEASE ORAL at 15:39

## 2019-11-12 RX ADMIN — MORPHINE SULFATE 2 MILLIGRAM(S): 50 CAPSULE, EXTENDED RELEASE ORAL at 06:34

## 2019-11-12 RX ADMIN — Medication 650 MILLIGRAM(S): at 17:18

## 2019-11-12 NOTE — PROGRESS NOTE ADULT - PROBLEM SELECTOR PLAN 1
-Repeat MRCP to r/o gallstone pancreatitis (in the setting of clinical change of pancreatitis)   -w/u including TG, IGG4  -LFTs daily   -CLD, slowly advance as tolerated   -Appreciate surgery recommendations

## 2019-11-12 NOTE — PROGRESS NOTE ADULT - ASSESSMENT
ABDOMINAL PAIN with pancreatitis, + gallstones, dilated CBD 7 mm, mild wall thickening.     Recent admission for similar symptoms with elevated ast/alt 100-200, normal bili, MRCP negative for choledocholithiasis and HIDA negative for cholecystitis.     On current admission presents with abdominal pain with lipase 30k. Denies any new medications, ETOH abuse, trauma, infections.     Would recommend repeat MRCP for further evaluation given gallstone pancreatitis dilated cbd, prior to cholecystectomy.

## 2019-11-12 NOTE — PROGRESS NOTE ADULT - SUBJECTIVE AND OBJECTIVE BOX
INTERVAL HPI/OVERNIGHT EVENTS:  Pt. seen and examined at bedside resting comfortably. Complains of epigastric abdominal pain, improving and well controlled with medications. Admits to intermittent nausea but denies vomiting. No flatus, no BM since , states she "normally goes everyday." Ambulating and voiding well.   Denies fever/chills, chest pain, dyspnea, cough, dizziness.     Vital Signs Last 24 Hrs  T(C): 37.8 (2019 23:15), Max: 37.9 (2019 21:39)  T(F): 100 (2019 23:15), Max: 100.3 (2019 21:39)  HR: 116 (2019 23:15) (84 - 119)  BP: 128/78 (2019 23:15) (120/68 - 128/78)  RR: 18 (2019 23:15) (16 - 18)  SpO2: 97% (2019 23:15) (96% - 99%)    PHYSICAL EXAM:    GENERAL: NAD  CHEST/LUNG: Clear to ausculation, bilaterally   HEART: S1S2  ABDOMEN: non distended, +BS, soft, +Epigastric and RUQ tenderness to palpation with voluntary guarding, no rebound   EXTREMITIES:  calf soft, non tender b/l. 2+ distal pulses b/l.     I&O's Detail    10 Nov 2019 07:01  -  2019 07:00  --------------------------------------------------------  IN:    lactated ringers.: 400 mL  Total IN: 400 mL    OUT:    Voided: 250 mL  Total OUT: 250 mL    Total NET: 150 mL      2019 07:01  -  2019 06:32  --------------------------------------------------------  IN:  Total IN: 0 mL    OUT:    Voided: 250 mL  Total OUT: 250 mL    Total NET: -250 mL          LABS:                        14.2   9.94  )-----------( 182      ( 2019 06:22 )             42.9         141  |  112<H>  |  9   ----------------------------<  86  3.4<L>   |  24  |  0.60    Ca    9.0      2019 06:22  Phos  3.1       Mg     1.7         TPro  6.0  /  Alb  2.8<L>  /  TBili  0.6  /  DBili  .19  /  AST  53<H>  /  ALT  132<H>  /  AlkPhos  113        Urinalysis Basic - ( 2019 03:01 )    Color: Yellow / Appearance: Clear / S.025 / pH: x  Gluc: x / Ketone: Trace  / Bili: Negative / Urobili: Negative mg/dL   Blood: x / Protein: 15 mg/dL / Nitrite: Negative   Leuk Esterase: Trace / RBC: 0-2 /HPF / WBC 11-25   Sq Epi: x / Non Sq Epi: Few / Bacteria: Few      Culture Results:   No growth to date. ( @ 00:54)  Culture Results:   No growth to date. ( @ 00:54)    Impression: 54 y/o female admitted with gallstone pancreatitis, sepsis present on admission (hypotensive with leukocytosis)  with known cholelithiasis, s/p normal HIDA and MRCP with no choledocholithiasis on prior admission last week  Leukocytosis now resolved, liver enzymes and lipase improving    Plan:   -continue NPO, IVF, analgesia prn  -GI consult appreciated, consider repeat MRCP  -f/u labs and trend enzymes  -DVT prophylaxis, Incentive Spirometer, OOB, Ambulating, pain control   -As Discussed with Dr. Burden. Barbra ramires planning once pancreatitis resolves

## 2019-11-12 NOTE — PROGRESS NOTE ADULT - SUBJECTIVE AND OBJECTIVE BOX
BOBBY MALDONADO  MRN-[id number]  Culture - Blood (11.11.19 @ 00:54)    Specimen Source: .Blood Blood-Venous    Culture Results:   No growth to date.      GENERAL SURGERY/ DR. BARKLEY    LOW GRADE FEVER NOTED, NO CHILLS  SOME NAUSEA, NO VOMITING  VOIDING  AMBULATING       Vital Signs Last 24 Hrs  T(C): 37.5 (12 Nov 2019 06:00), Max: 37.9 (11 Nov 2019 21:39)  T(F): 99.5 (12 Nov 2019 06:00), Max: 100.3 (11 Nov 2019 21:39)  HR: 100 (12 Nov 2019 06:00) (99 - 119)  BP: 135/76 (12 Nov 2019 06:00) (120/68 - 135/76)  BP(mean): --  RR: 18 (12 Nov 2019 06:00) (16 - 18)  SpO2: 98% (12 Nov 2019 06:00) (96% - 99%)      11-11-19 @ 07:01  -  11-12-19 @ 07:00  --------------------------------------------------------  IN: 1500 mL / OUT: 250 mL / NET: 1250 mL     LUNGS: CLEAR TO AUSCULTATION , NO W/R/R  ABDOMEN;+ BS, SOFT, MILD DISTENTION, TENDER LUQ AND MIDEPIGASTRIC, NO PALPABLE MASS     EXTREMITY: NO EDEMA, NO CALF TENDERNESS                            13.6   11.67 )-----------( 165      ( 12 Nov 2019 07:50 )             40.6     Culture - Blood (11.11.19 @ 00:54)    Specimen Source: .Blood Blood-Venous    Culture Results:   No growth to date.    Culture - Blood (11.11.19 @ 00:54)    Specimen Source: .Blood Blood-Peripheral    Culture Results:   No growth to date.                           ASSESSMENT &  PLAN: GALLSTONE PANCREATITIS     NPO  HYDRATION  PAIN MANAGEMENT  FOLLOW UP LFTs, AMYLASE, LIPASE  GI CONSULT NOTED AND APPRECIATED   BASED ON CLINICAL COURSE MAY REPEAT MRCP   PLAN FOR  LAPAROSCOPIC CHOLECYSTECTOMY ONCE PANCREATITIS IMPROVES

## 2019-11-12 NOTE — PROGRESS NOTE ADULT - SUBJECTIVE AND OBJECTIVE BOX
Gastroenterology  Patient seen and examined bedside resting comfortably.  No complaints offered including abdominal pain, nausea/vomiting, fevers, chills.     T(F): 99.5 (11-12-19 @ 06:00), Max: 100.3 (11-11-19 @ 21:39)  HR: 100 (11-12-19 @ 06:00) (99 - 119)  BP: 135/76 (11-12-19 @ 06:00) (120/68 - 135/76)  RR: 18 (11-12-19 @ 06:00) (16 - 18)  SpO2: 98% (11-12-19 @ 06:00) (96% - 99%)  Wt(kg): --  CAPILLARY BLOOD GLUCOSE          PHYSICAL EXAM:  General: NAD, WDWN.   Neuro:  Alert & responsive  HEENT: NCAT, EOMI, conjunctiva clear  CV: +S1+S2 regular rate and rhythm  Lung: clear to ausculation bilaterally, respirations nonlabored, good inspiratory effort  Abdomen: soft, Non Tender, No distention Normal active BS  Extremities: no cyanosis, clubbing or edema    LABS:                        13.6   11.67 )-----------( 165      ( 12 Nov 2019 07:50 )             40.6     11-12    137  |  106  |  4<L>  ----------------------------<  86  3.4<L>   |  24  |  0.52    Ca    8.9      12 Nov 2019 07:50  Phos  3.1     11-11  Mg     1.7     11-11    TPro  5.9<L>  /  Alb  2.5<L>  /  TBili  1.0  /  DBili  .20  /  AST  24  /  ALT  74  /  AlkPhos  97  11-12    LIVER FUNCTIONS - ( 12 Nov 2019 07:50 )  Alb: 2.5 g/dL / Pro: 5.9 gm/dL / ALK PHOS: 97 U/L / ALT: 74 U/L / AST: 24 U/L / GGT: x             I&O's Detail    11 Nov 2019 07:01  -  12 Nov 2019 07:00  --------------------------------------------------------  IN:    dextrose 5% + sodium chloride 0.45% with potassium chloride 20 mEq/L: 1500 mL  Total IN: 1500 mL    OUT:    Voided: 250 mL  Total OUT: 250 mL    Total NET: 1250 mL        11-12 @ 07:50    137 | 106 | 4  /8.9 | -- | --  _______________________/  3.4 | 24 | 0.52                           \par   Amylase, Serum Total: 613 U/L (11-12 @ 07:50)

## 2019-11-13 LAB
ALBUMIN SERPL ELPH-MCNC: 2.3 G/DL — LOW (ref 3.3–5)
ALP SERPL-CCNC: 99 U/L — SIGNIFICANT CHANGE UP (ref 40–120)
ALT FLD-CCNC: 59 U/L — SIGNIFICANT CHANGE UP (ref 12–78)
AMYLASE P1 CFR SERPL: 254 U/L — HIGH (ref 25–115)
ANION GAP SERPL CALC-SCNC: 5 MMOL/L — SIGNIFICANT CHANGE UP (ref 5–17)
AST SERPL-CCNC: 29 U/L — SIGNIFICANT CHANGE UP (ref 15–37)
BASOPHILS # BLD AUTO: 0.03 K/UL — SIGNIFICANT CHANGE UP (ref 0–0.2)
BASOPHILS NFR BLD AUTO: 0.3 % — SIGNIFICANT CHANGE UP (ref 0–2)
BILIRUB SERPL-MCNC: 1.1 MG/DL — SIGNIFICANT CHANGE UP (ref 0.2–1.2)
BUN SERPL-MCNC: 4 MG/DL — LOW (ref 7–23)
CALCIUM SERPL-MCNC: 8.8 MG/DL — SIGNIFICANT CHANGE UP (ref 8.5–10.1)
CHLORIDE SERPL-SCNC: 107 MMOL/L — SIGNIFICANT CHANGE UP (ref 96–108)
CO2 SERPL-SCNC: 27 MMOL/L — SIGNIFICANT CHANGE UP (ref 22–31)
CREAT SERPL-MCNC: 0.56 MG/DL — SIGNIFICANT CHANGE UP (ref 0.5–1.3)
EOSINOPHIL # BLD AUTO: 0.12 K/UL — SIGNIFICANT CHANGE UP (ref 0–0.5)
EOSINOPHIL NFR BLD AUTO: 1.1 % — SIGNIFICANT CHANGE UP (ref 0–6)
GLUCOSE SERPL-MCNC: 93 MG/DL — SIGNIFICANT CHANGE UP (ref 70–99)
HCT VFR BLD CALC: 37.7 % — SIGNIFICANT CHANGE UP (ref 34.5–45)
HGB BLD-MCNC: 12.4 G/DL — SIGNIFICANT CHANGE UP (ref 11.5–15.5)
IMM GRANULOCYTES NFR BLD AUTO: 0.3 % — SIGNIFICANT CHANGE UP (ref 0–1.5)
LIDOCAIN IGE QN: 649 U/L — HIGH (ref 73–393)
LYMPHOCYTES # BLD AUTO: 1.69 K/UL — SIGNIFICANT CHANGE UP (ref 1–3.3)
LYMPHOCYTES # BLD AUTO: 15.7 % — SIGNIFICANT CHANGE UP (ref 13–44)
MCHC RBC-ENTMCNC: 26.8 PG — LOW (ref 27–34)
MCHC RBC-ENTMCNC: 32.9 GM/DL — SIGNIFICANT CHANGE UP (ref 32–36)
MCV RBC AUTO: 81.4 FL — SIGNIFICANT CHANGE UP (ref 80–100)
MONOCYTES # BLD AUTO: 1.12 K/UL — HIGH (ref 0–0.9)
MONOCYTES NFR BLD AUTO: 10.4 % — SIGNIFICANT CHANGE UP (ref 2–14)
NEUTROPHILS # BLD AUTO: 7.78 K/UL — HIGH (ref 1.8–7.4)
NEUTROPHILS NFR BLD AUTO: 72.2 % — SIGNIFICANT CHANGE UP (ref 43–77)
NRBC # BLD: 0 /100 WBCS — SIGNIFICANT CHANGE UP (ref 0–0)
PLATELET # BLD AUTO: 162 K/UL — SIGNIFICANT CHANGE UP (ref 150–400)
POTASSIUM SERPL-MCNC: 3.6 MMOL/L — SIGNIFICANT CHANGE UP (ref 3.5–5.3)
POTASSIUM SERPL-SCNC: 3.6 MMOL/L — SIGNIFICANT CHANGE UP (ref 3.5–5.3)
PROT SERPL-MCNC: 6.1 GM/DL — SIGNIFICANT CHANGE UP (ref 6–8.3)
RBC # BLD: 4.63 M/UL — SIGNIFICANT CHANGE UP (ref 3.8–5.2)
RBC # FLD: 13.4 % — SIGNIFICANT CHANGE UP (ref 10.3–14.5)
SODIUM SERPL-SCNC: 139 MMOL/L — SIGNIFICANT CHANGE UP (ref 135–145)
WBC # BLD: 10.77 K/UL — HIGH (ref 3.8–10.5)
WBC # FLD AUTO: 10.77 K/UL — HIGH (ref 3.8–10.5)

## 2019-11-13 PROCEDURE — 99231 SBSQ HOSP IP/OBS SF/LOW 25: CPT

## 2019-11-13 RX ORDER — LANOLIN ALCOHOL/MO/W.PET/CERES
3 CREAM (GRAM) TOPICAL AT BEDTIME
Refills: 0 | Status: COMPLETED | OUTPATIENT
Start: 2019-11-13 | End: 2019-11-13

## 2019-11-13 RX ORDER — LANOLIN ALCOHOL/MO/W.PET/CERES
3 CREAM (GRAM) TOPICAL ONCE
Refills: 0 | Status: COMPLETED | OUTPATIENT
Start: 2019-11-13 | End: 2019-11-13

## 2019-11-13 RX ADMIN — HEPARIN SODIUM 5000 UNIT(S): 5000 INJECTION INTRAVENOUS; SUBCUTANEOUS at 17:53

## 2019-11-13 RX ADMIN — Medication 650 MILLIGRAM(S): at 18:50

## 2019-11-13 RX ADMIN — MORPHINE SULFATE 2 MILLIGRAM(S): 50 CAPSULE, EXTENDED RELEASE ORAL at 14:25

## 2019-11-13 RX ADMIN — MORPHINE SULFATE 2 MILLIGRAM(S): 50 CAPSULE, EXTENDED RELEASE ORAL at 02:05

## 2019-11-13 RX ADMIN — HEPARIN SODIUM 5000 UNIT(S): 5000 INJECTION INTRAVENOUS; SUBCUTANEOUS at 06:11

## 2019-11-13 RX ADMIN — Medication 650 MILLIGRAM(S): at 06:40

## 2019-11-13 RX ADMIN — MORPHINE SULFATE 2 MILLIGRAM(S): 50 CAPSULE, EXTENDED RELEASE ORAL at 14:10

## 2019-11-13 RX ADMIN — DEXTROSE MONOHYDRATE, SODIUM CHLORIDE, AND POTASSIUM CHLORIDE 150 MILLILITER(S): 50; .745; 4.5 INJECTION, SOLUTION INTRAVENOUS at 13:14

## 2019-11-13 RX ADMIN — DEXTROSE MONOHYDRATE, SODIUM CHLORIDE, AND POTASSIUM CHLORIDE 150 MILLILITER(S): 50; .745; 4.5 INJECTION, SOLUTION INTRAVENOUS at 17:54

## 2019-11-13 RX ADMIN — DEXTROSE MONOHYDRATE, SODIUM CHLORIDE, AND POTASSIUM CHLORIDE 150 MILLILITER(S): 50; .745; 4.5 INJECTION, SOLUTION INTRAVENOUS at 01:38

## 2019-11-13 RX ADMIN — Medication 3 MILLIGRAM(S): at 01:35

## 2019-11-13 RX ADMIN — MORPHINE SULFATE 2 MILLIGRAM(S): 50 CAPSULE, EXTENDED RELEASE ORAL at 01:35

## 2019-11-13 RX ADMIN — Medication 650 MILLIGRAM(S): at 06:10

## 2019-11-13 RX ADMIN — Medication 200 MILLIGRAM(S): at 21:47

## 2019-11-13 RX ADMIN — Medication 3 MILLIGRAM(S): at 21:48

## 2019-11-13 NOTE — PROGRESS NOTE ADULT - SUBJECTIVE AND OBJECTIVE BOX
Gastroenterology  Patient seen and examined bedside resting comfortably.  Abdominal pain improved. Denies any nausea/vomiting, fevers. Reports that she is tolerating cld well.     T(F): 99.3 (11-13-19 @ 12:36), Max: 101.1 (11-12-19 @ 17:06)  HR: 113 (11-13-19 @ 12:36) (99 - 113)  BP: 123/79 (11-13-19 @ 12:36) (106/64 - 134/75)  RR: 18 (11-13-19 @ 12:36) (17 - 18)  SpO2: 97% (11-13-19 @ 12:36) (97% - 100%)  Wt(kg): --  CAPILLARY BLOOD GLUCOSE          PHYSICAL EXAM:  General: NAD  Neuro:  Alert & responsive  HEENT: NCAT, EOMI, conjunctiva clear  CV: +S1+S2 regular rate and rhythm  Lung: clear to ausculation bilaterally, respirations nonlabored, good inspiratory effort  Abdomen: soft, mild ttp diffusely, No distention Normal active BS  Extremities: no cyanosis, clubbing or edema    LABS:                        12.4   10.77 )-----------( 162      ( 13 Nov 2019 07:17 )             37.7     11-13    139  |  107  |  4<L>  ----------------------------<  93  3.6   |  27  |  0.56    Ca    8.8      13 Nov 2019 07:17    TPro  6.1  /  Alb  2.3<L>  /  TBili  1.1  /  DBili  x   /  AST  29  /  ALT  59  /  AlkPhos  99  11-13    LIVER FUNCTIONS - ( 13 Nov 2019 07:17 )  Alb: 2.3 g/dL / Pro: 6.1 gm/dL / ALK PHOS: 99 U/L / ALT: 59 U/L / AST: 29 U/L / GGT: x             I&O's Detail    12 Nov 2019 07:01  -  13 Nov 2019 07:00  --------------------------------------------------------  IN:    dextrose 5% + sodium chloride 0.45% with potassium chloride 20 mEq/L: 1500 mL  Total IN: 1500 mL    OUT:    Voided: 2150 mL  Total OUT: 2150 mL    Total NET: -650 mL      13 Nov 2019 07:01  -  13 Nov 2019 16:26  --------------------------------------------------------  IN:  Total IN: 0 mL    OUT:    Voided: 350 mL  Total OUT: 350 mL    Total NET: -350 mL        11-13 @ 07:17    139 | 107 | 4  /8.8 | -- | --  _______________________/  3.6 | 27 | 0.56                           \par   Amylase, Serum Total: 254 U/L (11-13 @ 07:17)

## 2019-11-13 NOTE — PROGRESS NOTE ADULT - PROBLEM SELECTOR PLAN 1
-Repeat MRCP to r/o gallstone pancreatitis (in the setting of clinical change of pancreatitis)   -Advanced diet as tolerated  -IV hydration   -w/u including TG, IGG4  -LFTs daily   -Appreciate surgery recommendations.

## 2019-11-13 NOTE — CHART NOTE - NSCHARTNOTEFT_GEN_A_CORE
November 13, 2019    To whom it may concern:    Ms Diane Hill has been an inpatient here at [facility] since Nov. 10, 2019.    Her anticipated date of discharge is still to be determined.    James M Behan, MPAS, PA-C  Senior Surgical Physician Assistant.

## 2019-11-13 NOTE — PROGRESS NOTE ADULT - SUBJECTIVE AND OBJECTIVE BOX
ERICA BOBBY  MRN-[id number] 53y    GENERAL SURGERY/ DR. BARKLEY    FEELS MUCH BETTER THIS MORNING  ABDOMINAL PAIN HAS IMPROVED  NO CHILLS, T .1 NOTED  + FLATUS, + BM,  VOIDING   AMBULATING    Vital Signs Last 24 Hrs  T(C): 37.8 (13 Nov 2019 05:26), Max: 38.4 (12 Nov 2019 17:06)  T(F): 100 (13 Nov 2019 05:26), Max: 101.1 (12 Nov 2019 17:06)  HR: 99 (13 Nov 2019 05:26) (99 - 113)  BP: 106/64 (13 Nov 2019 05:26) (106/64 - 134/75)  BP(mean): --  RR: 18 (13 Nov 2019 05:26) (17 - 18)  SpO2: 97% (13 Nov 2019 05:26) (97% - 100%)      11-12-19 @ 07:01  -  11-13-19 @ 07:00  Lipase, Serum in AM (11.13.19 @ 07:17)    Lipase, Serum: 649 U/L    --------------------------------------------------------  IN: 1500 mL / OUT: 2150 mL / NET: -650 mL    VOIDED 2150 ML     LUNGS: CLEAR TO AUSCULTATION , NO W/R/R  ABDOMEN;+ BS, SOFT, SOME DISTENTION, TENDER LUQ AND MIDEPIGASTRIC ( LESS COMPARE TO PREVIOUS EXAM ) , NO PALPABLE MASS     EXTREMITY: NO EDEMA, NO CALF TENDERNESS                              12.4   10.77 )-----------( 162      ( 13 Nov 2019 07:17 )             37.7      11-13    139  |  107  |  4<L>  ----------------------------<  93  3.6   |  27  |  0.56    Ca    8.8      13 Nov 2019 07:17    TPro  6.1  /  Alb  2.3<L>  /  TBili  1.1  /  DBili  x   /  AST  29  /  ALT  59  /  AlkPhos  99  11-13    Amylase, Serum Total in AM (11.13.19 @ 07:17)    Amylase, Serum Total: 254 U/L    Lipase, Serum in AM (11.13.19 @ 07:17)    Lipase, Serum: 649 U/L    ASSESSMENT &  PLAN: GALLSTONE PANCREATITIS RESOLVING LFTs, AMYLASE AND LIPASE IMPROVING      NPO  HYDRATION  PAIN MANAGEMENT  FOLLOW UP LFTs, AMYLASE, LIPASE  GI CONSULT NOTED AND APPRECIATED    PLAN FOR  LAPAROSCOPIC CHOLECYSTECTOMY ONCE PANCREATITIS RESOLVES                                        ASSESSMENT &  PLAN:  POD #  S/P ERICA BOBBY  MRN-[id number]    GENERAL SURGERY/ DR. BARKLEY    FEELS MUCH BETTER THIS MORNING  ABDOMINAL PAIN HAS IMPROVED  NO CHILLS, T .1 NOTED  + FLATUS, + BM,  VOIDING   AMBULATING    Vital Signs Last 24 Hrs  T(C): 37.8 (13 Nov 2019 05:26), Max: 38.4 (12 Nov 2019 17:06)  T(F): 100 (13 Nov 2019 05:26), Max: 101.1 (12 Nov 2019 17:06)  HR: 99 (13 Nov 2019 05:26) (99 - 113)  BP: 106/64 (13 Nov 2019 05:26) (106/64 - 134/75)  BP(mean): --  RR: 18 (13 Nov 2019 05:26) (17 - 18)  SpO2: 97% (13 Nov 2019 05:26) (97% - 100%)      11-12-19 @ 07:01  -  11-13-19 @ 07:00  Lipase, Serum in AM (11.13.19 @ 07:17)    Lipase, Serum: 649 U/L    --------------------------------------------------------  IN: 1500 mL / OUT: 2150 mL / NET: -650 mL    VOIDED 2150 ML     LUNGS: CLEAR TO AUSCULTATION , NO W/R/R  ABDOMEN;+ BS, SOFT, SOME DISTENTION, TENDER LUQ AND MIDEPIGASTRIC ( LESS COMPARE TO PREVIOUS EXAM ) , NO PALPABLE MASS     EXTREMITY: NO EDEMA, NO CALF TENDERNESS                              12.4   10.77 )-----------( 162      ( 13 Nov 2019 07:17 )             37.7      11-13    139  |  107  |  4<L>  ----------------------------<  93  3.6   |  27  |  0.56    Ca    8.8      13 Nov 2019 07:17    TPro  6.1  /  Alb  2.3<L>  /  TBili  1.1  /  DBili  x   /  AST  29  /  ALT  59  /  AlkPhos  99  11-13    Amylase, Serum Total in AM (11.13.19 @ 07:17)    Amylase, Serum Total: 254 U/L    Lipase, Serum in AM (11.13.19 @ 07:17)    Lipase, Serum: 649 U/L    ASSESSMENT &  PLAN: GALLSTONE PANCREATITIS RESOLVING LFTs, AMYLASE AND LIPASE IMPROVING      NPO  HYDRATION  PAIN MANAGEMENT  FOLLOW UP LFTs, AMYLASE, LIPASE  GI CONSULT NOTED AND APPRECIATED    PLAN FOR  LAPAROSCOPIC CHOLECYSTECTOMY ONCE PANCREATITIS RESOLVES , POSSIBLE FRIDAY 11/15/2019

## 2019-11-14 LAB
ALBUMIN SERPL ELPH-MCNC: 2.2 G/DL — LOW (ref 3.3–5)
ALP SERPL-CCNC: 98 U/L — SIGNIFICANT CHANGE UP (ref 40–120)
ALT FLD-CCNC: 51 U/L — SIGNIFICANT CHANGE UP (ref 12–78)
AMYLASE P1 CFR SERPL: 150 U/L — HIGH (ref 25–115)
ANION GAP SERPL CALC-SCNC: 6 MMOL/L — SIGNIFICANT CHANGE UP (ref 5–17)
AST SERPL-CCNC: 19 U/L — SIGNIFICANT CHANGE UP (ref 15–37)
BASOPHILS # BLD AUTO: 0.03 K/UL — SIGNIFICANT CHANGE UP (ref 0–0.2)
BASOPHILS NFR BLD AUTO: 0.3 % — SIGNIFICANT CHANGE UP (ref 0–2)
BILIRUB SERPL-MCNC: 0.6 MG/DL — SIGNIFICANT CHANGE UP (ref 0.2–1.2)
BLD GP AB SCN SERPL QL: SIGNIFICANT CHANGE UP
BUN SERPL-MCNC: 3 MG/DL — LOW (ref 7–23)
CALCIUM SERPL-MCNC: 8.4 MG/DL — LOW (ref 8.5–10.1)
CHLORIDE SERPL-SCNC: 108 MMOL/L — SIGNIFICANT CHANGE UP (ref 96–108)
CO2 SERPL-SCNC: 24 MMOL/L — SIGNIFICANT CHANGE UP (ref 22–31)
CREAT SERPL-MCNC: 0.52 MG/DL — SIGNIFICANT CHANGE UP (ref 0.5–1.3)
EOSINOPHIL # BLD AUTO: 0.24 K/UL — SIGNIFICANT CHANGE UP (ref 0–0.5)
EOSINOPHIL NFR BLD AUTO: 2.6 % — SIGNIFICANT CHANGE UP (ref 0–6)
GLUCOSE SERPL-MCNC: 100 MG/DL — HIGH (ref 70–99)
HCT VFR BLD CALC: 34.9 % — SIGNIFICANT CHANGE UP (ref 34.5–45)
HGB BLD-MCNC: 11.6 G/DL — SIGNIFICANT CHANGE UP (ref 11.5–15.5)
IGG4 SER-MCNC: 23.9 MG/DL — SIGNIFICANT CHANGE UP (ref 2.4–121)
IMM GRANULOCYTES NFR BLD AUTO: 0.4 % — SIGNIFICANT CHANGE UP (ref 0–1.5)
INR BLD: 1.4 RATIO — HIGH (ref 0.88–1.16)
LIDOCAIN IGE QN: 597 U/L — HIGH (ref 73–393)
LYMPHOCYTES # BLD AUTO: 1.34 K/UL — SIGNIFICANT CHANGE UP (ref 1–3.3)
LYMPHOCYTES # BLD AUTO: 14.7 % — SIGNIFICANT CHANGE UP (ref 13–44)
MCHC RBC-ENTMCNC: 26.7 PG — LOW (ref 27–34)
MCHC RBC-ENTMCNC: 33.2 GM/DL — SIGNIFICANT CHANGE UP (ref 32–36)
MCV RBC AUTO: 80.2 FL — SIGNIFICANT CHANGE UP (ref 80–100)
MONOCYTES # BLD AUTO: 1.08 K/UL — HIGH (ref 0–0.9)
MONOCYTES NFR BLD AUTO: 11.8 % — SIGNIFICANT CHANGE UP (ref 2–14)
NEUTROPHILS # BLD AUTO: 6.4 K/UL — SIGNIFICANT CHANGE UP (ref 1.8–7.4)
NEUTROPHILS NFR BLD AUTO: 70.2 % — SIGNIFICANT CHANGE UP (ref 43–77)
NRBC # BLD: 0 /100 WBCS — SIGNIFICANT CHANGE UP (ref 0–0)
PLATELET # BLD AUTO: 166 K/UL — SIGNIFICANT CHANGE UP (ref 150–400)
POTASSIUM SERPL-MCNC: 3.4 MMOL/L — LOW (ref 3.5–5.3)
POTASSIUM SERPL-SCNC: 3.4 MMOL/L — LOW (ref 3.5–5.3)
PROT SERPL-MCNC: 6 GM/DL — SIGNIFICANT CHANGE UP (ref 6–8.3)
PROTHROM AB SERPL-ACNC: 15.9 SEC — HIGH (ref 10–12.9)
RBC # BLD: 4.35 M/UL — SIGNIFICANT CHANGE UP (ref 3.8–5.2)
RBC # FLD: 13.2 % — SIGNIFICANT CHANGE UP (ref 10.3–14.5)
SODIUM SERPL-SCNC: 138 MMOL/L — SIGNIFICANT CHANGE UP (ref 135–145)
TRIGL SERPL-MCNC: 65 MG/DL — SIGNIFICANT CHANGE UP (ref 10–149)
WBC # BLD: 9.13 K/UL — SIGNIFICANT CHANGE UP (ref 3.8–10.5)
WBC # FLD AUTO: 9.13 K/UL — SIGNIFICANT CHANGE UP (ref 3.8–10.5)

## 2019-11-14 PROCEDURE — 99231 SBSQ HOSP IP/OBS SF/LOW 25: CPT

## 2019-11-14 PROCEDURE — 74181 MRI ABDOMEN W/O CONTRAST: CPT | Mod: 26

## 2019-11-14 PROCEDURE — 74177 CT ABD & PELVIS W/CONTRAST: CPT | Mod: 26

## 2019-11-14 RX ORDER — POTASSIUM CHLORIDE 20 MEQ
40 PACKET (EA) ORAL EVERY 4 HOURS
Refills: 0 | Status: COMPLETED | OUTPATIENT
Start: 2019-11-14 | End: 2019-11-14

## 2019-11-14 RX ORDER — ACETAMINOPHEN 500 MG
650 TABLET ORAL EVERY 6 HOURS
Refills: 0 | Status: DISCONTINUED | OUTPATIENT
Start: 2019-11-14 | End: 2019-11-20

## 2019-11-14 RX ORDER — LANOLIN ALCOHOL/MO/W.PET/CERES
3 CREAM (GRAM) TOPICAL AT BEDTIME
Refills: 0 | Status: DISCONTINUED | OUTPATIENT
Start: 2019-11-14 | End: 2019-11-20

## 2019-11-14 RX ADMIN — Medication 40 MILLIEQUIVALENT(S): at 09:50

## 2019-11-14 RX ADMIN — DEXTROSE MONOHYDRATE, SODIUM CHLORIDE, AND POTASSIUM CHLORIDE 150 MILLILITER(S): 50; .745; 4.5 INJECTION, SOLUTION INTRAVENOUS at 00:01

## 2019-11-14 RX ADMIN — HEPARIN SODIUM 5000 UNIT(S): 5000 INJECTION INTRAVENOUS; SUBCUTANEOUS at 05:38

## 2019-11-14 RX ADMIN — Medication 40 MILLIEQUIVALENT(S): at 13:19

## 2019-11-14 RX ADMIN — HEPARIN SODIUM 5000 UNIT(S): 5000 INJECTION INTRAVENOUS; SUBCUTANEOUS at 17:34

## 2019-11-14 RX ADMIN — Medication 200 MILLIGRAM(S): at 17:33

## 2019-11-14 RX ADMIN — Medication 650 MILLIGRAM(S): at 17:34

## 2019-11-14 RX ADMIN — Medication 200 MILLIGRAM(S): at 09:50

## 2019-11-14 RX ADMIN — Medication 3 MILLIGRAM(S): at 22:27

## 2019-11-14 RX ADMIN — DEXTROSE MONOHYDRATE, SODIUM CHLORIDE, AND POTASSIUM CHLORIDE 150 MILLILITER(S): 50; .745; 4.5 INJECTION, SOLUTION INTRAVENOUS at 05:42

## 2019-11-14 RX ADMIN — Medication 40 MILLIEQUIVALENT(S): at 17:33

## 2019-11-14 NOTE — PROGRESS NOTE ADULT - ASSESSMENT
ABDOMINAL PAIN with pancreatitis, + gallstones, dilated CBD 7 mm, mild wall thickening.     Recent admission for similar symptoms with elevated ast/alt 100-200, normal bili, MRCP negative for choledocholithiasis and HIDA negative for cholecystitis.     On current admission presents with abdominal pain with lipase 30k. Denies any new medications, ETOH abuse, trauma, infections.     MRCP repeated 11/13: noted cholelithiasis with pericholecystic fluid ?cholecystitis, acute pancreatitis, NO evidence of choleodocholithiasis, mild PD dilation 4 mm.     Appreciate surgery recs re: further management of cholecystitis.

## 2019-11-14 NOTE — PROGRESS NOTE ADULT - SUBJECTIVE AND OBJECTIVE BOX
Gastroenterology  Patient seen and examined bedside resting comfortably.  Abdominal pain improved. She denies any nausea/vomiting, fevers, chills. Tolerating liquids well.       T(F): 98.6 (11-14-19 @ 00:25), Max: 101 (11-13-19 @ 17:20)  HR: 61 (11-14-19 @ 00:25) (61 - 114)  BP: 159/75 (11-14-19 @ 00:25) (107/53 - 159/75)  RR: 18 (11-14-19 @ 00:25) (17 - 18)  SpO2: 98% (11-14-19 @ 00:25) (95% - 98%)  Wt(kg): --  CAPILLARY BLOOD GLUCOSE      PHYSICAL EXAM:  General: NAD, WDWN.   Neuro:  Alert & responsive  HEENT: NCAT, EOMI, conjunctiva clear  CV: +S1+S2 regular rate and rhythm  Lung: clear to ausculation bilaterally, respirations nonlabored, good inspiratory effort  Abdomen: soft, mild ttp diffusely, No distention Normal active BS  Extremities: no cyanosis, clubbing or edema    LABS:                        11.6   9.13  )-----------( 166      ( 14 Nov 2019 06:34 )             34.9     11-14    138  |  108  |  3<L>  ----------------------------<  100<H>  3.4<L>   |  24  |  0.52    Ca    8.4<L>      14 Nov 2019 06:34    TPro  6.0  /  Alb  2.2<L>  /  TBili  0.6  /  DBili  x   /  AST  19  /  ALT  51  /  AlkPhos  98  11-14    LIVER FUNCTIONS - ( 14 Nov 2019 06:34 )  Alb: 2.2 g/dL / Pro: 6.0 gm/dL / ALK PHOS: 98 U/L / ALT: 51 U/L / AST: 19 U/L / GGT: x           PT/INR - ( 14 Nov 2019 06:34 )   PT: 15.9 sec;   INR: 1.40 ratio           I&O's Detail    13 Nov 2019 07:01  -  14 Nov 2019 07:00  --------------------------------------------------------  IN:    dextrose 5% + sodium chloride 0.45% with potassium chloride 20 mEq/L: 1650 mL    Oral Fluid: 240 mL  Total IN: 1890 mL    OUT:    Voided: 1550 mL  Total OUT: 1550 mL    Total NET: 340 mL        11-14 @ 06:34    138 | 108 | 3  /8.4 | -- | --  _______________________/  3.4 | 24 | 0.52                           \par   Amylase, Serum Total: 150 U/L (11-14 @ 06:34)        < from: MR MRCP No Cont (11.14.19 @ 09:27) >    EXAM:  MR MRCP                            PROCEDURE DATE:  11/14/2019          INTERPRETATION:  MRCP without IV contrast    Indication: Gallstone pancreatitis. Elevated liver enzymes.    Technique: Utilizing a 1.5 Betsy high-field magnet, multiplanar   multisequence MR images of the abdomen are acquired without IV contrast,   supplemented by thin and thick slab MRCP images.    Comparison: Abdominal MR dated 11/8/2019.    Findings: Limited imaging through the lower chest demonstrates new mild   bilateral pleural effusions. There is a small hemangioma in T9 vertebra.   Small hiatal hernia.    Small gallstones in the gallbladder. Pericholecystic fluid is noted. The   common bile duct maintains normal caliber measuring up to 5.3 mm in   diameter. No suspicious filling defect in the common bile duct to suggest   choledocholithiasis. Nonspecific slight main pancreatic duct dilatation   in the pancreatic head measuring up to 4 mm in diameter. No gross   pancreatic mass is identified on this noncontrast examination.    New mild peripancreatic edema and fluid, suggestive of acute   pancreatitis. Clinical correction with pancreatic enzymes is recommended.    Multiple small brightly T2 hyperintense lesions are again seen in the   liver, suggestive of cysts or hemangiomas. Further differentiation is   difficult without IV contrast.    Allowing for the noncontrast technique, the spleen, adrenals and the   right kidney appear grossly unremarkable. There is a small brightly T2   hyperintense lesion in the left kidney, representing a probable cyst.    Mild ascites. No grossly enlarged lymph node. The visualized bowel   structures appear unremarkable.    Impression: Mild bilateral pleural effusions.    Cholelithiasis with pericholecystic fluid which may be part of the   ascites or may be due to acute cholecystitis. Clinical correlation is   recommended. No suspicious filling defect in the common bile duct to   suggest choledocholithiasis.    Nonspecific slight main pancreatic duct dilatation in the pancreatic head   measuring up to 4 mm in diameter.     New mild peripancreatic edema and fluid, suggestive of acute   pancreatitis. Clinical correction with pancreatic enzymes is recommended.        RAVI ROD M.D., ATTENDING RADIOLOGIST  This document has been electronically signed. Nov 14 2019 11:04AM

## 2019-11-14 NOTE — PROGRESS NOTE ADULT - PROBLEM SELECTOR PLAN 1
Appreciate surgery recommendations.  Advanced diet as tolerated  IV hydration   w/u including TG, IGG4  LFTs daily

## 2019-11-14 NOTE — PROGRESS NOTE ADULT - SUBJECTIVE AND OBJECTIVE BOX
Patient seen and examined at bedside.  Tmax last evening 101.  Denies any other acute events overnight.    Admits to flatus/BM. Pt is OOB and ambulating.    Denies pain, nausea/ vomiting.   Tolerating clears.      Vital Signs Last 24 Hrs  T(F): 98.6 (11-14-19 @ 00:25), Max: 101 (11-13-19 @ 17:20)  HR: 61 (11-14-19 @ 00:25)  BP: 159/75 (11-14-19 @ 00:25)  RR: 18 (11-14-19 @ 00:25)  SpO2: 98% (11-14-19 @ 00:25)      GENERAL: Alert, NAD  CHEST/LUNG: Clear to auscultation bilaterally, respirations nonlabored  HEART: S1S2, Regular rate and rhythm;   ABDOMEN: +Bowel sounds, soft, nontender, Nondistended  EXTREMITIES:  no calf tenderness, No edema    I&O's Detail    13 Nov 2019 07:01  -  14 Nov 2019 07:00  --------------------------------------------------------  IN:    dextrose 5% + sodium chloride 0.45% with potassium chloride 20 mEq/L: 1650 mL    Oral Fluid: 240 mL  Total IN: 1890 mL    OUT:    Voided: 1550 mL  Total OUT: 1550 mL    Total NET: 340 mL        LABS:                        11.6   9.13  )-----------( 166      ( 14 Nov 2019 06:34 )             34.9     11-14    138  |  108  |  3<L>  ----------------------------<  100<H>  3.4<L>   |  24  |  0.52    Ca    8.4<L>      14 Nov 2019 06:34    TPro  6.0  /  Alb  2.2<L>  /  TBili  0.6  /  DBili  x   /  AST  19  /  ALT  51  /  AlkPhos  98  11-14    PT/INR - ( 14 Nov 2019 06:34 )   PT: 15.9 sec;   INR: 1.40 ratio       Impression: 54 y/o female admitted with gallstone pancreatitis, s/p normal HIDA and MRCP with no choledocholithiasis on prior admission last week.    Leukocytosis resolved, liver enzymes downtrending, good urine output (1550cc/24 hr).  However, pt with fever x 2 days (Spxo=770).      Plan:  - Will repeat abdominal CT with IV contrast due to persistent fevers to evaluate pancreas.  F/u CT.    - GI consult appreciated.  F/u rpt MRCP.    - Lap keyla booked as add-on for Friday.    - hypokalemia repleted   - f/u labs, continue to trend LFTs  - monitor i&o's   - DVT prophylaxis, Incentive Spirometer, OOB, Ambulating, pain control  - Will discuss with Dr. Burden

## 2019-11-15 LAB
ALBUMIN SERPL ELPH-MCNC: 2.6 G/DL — LOW (ref 3.3–5)
ALP SERPL-CCNC: 115 U/L — SIGNIFICANT CHANGE UP (ref 40–120)
ALT FLD-CCNC: 47 U/L — SIGNIFICANT CHANGE UP (ref 12–78)
AMYLASE P1 CFR SERPL: 138 U/L — HIGH (ref 25–115)
ANION GAP SERPL CALC-SCNC: 5 MMOL/L — SIGNIFICANT CHANGE UP (ref 5–17)
AST SERPL-CCNC: 17 U/L — SIGNIFICANT CHANGE UP (ref 15–37)
BILIRUB DIRECT SERPL-MCNC: 0.11 MG/DL — SIGNIFICANT CHANGE UP (ref 0.05–0.2)
BILIRUB INDIRECT FLD-MCNC: 0.4 MG/DL — SIGNIFICANT CHANGE UP (ref 0.2–1)
BILIRUB SERPL-MCNC: 0.5 MG/DL — SIGNIFICANT CHANGE UP (ref 0.2–1.2)
BUN SERPL-MCNC: 3 MG/DL — LOW (ref 7–23)
CALCIUM SERPL-MCNC: 9.6 MG/DL — SIGNIFICANT CHANGE UP (ref 8.5–10.1)
CHLORIDE SERPL-SCNC: 111 MMOL/L — HIGH (ref 96–108)
CO2 SERPL-SCNC: 23 MMOL/L — SIGNIFICANT CHANGE UP (ref 22–31)
CREAT SERPL-MCNC: 0.55 MG/DL — SIGNIFICANT CHANGE UP (ref 0.5–1.3)
GLUCOSE SERPL-MCNC: 100 MG/DL — HIGH (ref 70–99)
HCT VFR BLD CALC: 33.9 % — LOW (ref 34.5–45)
HGB BLD-MCNC: 11.4 G/DL — LOW (ref 11.5–15.5)
LIDOCAIN IGE QN: 621 U/L — HIGH (ref 73–393)
MAGNESIUM SERPL-MCNC: 2 MG/DL — SIGNIFICANT CHANGE UP (ref 1.6–2.6)
MCHC RBC-ENTMCNC: 27 PG — SIGNIFICANT CHANGE UP (ref 27–34)
MCHC RBC-ENTMCNC: 33.6 GM/DL — SIGNIFICANT CHANGE UP (ref 32–36)
MCV RBC AUTO: 80.1 FL — SIGNIFICANT CHANGE UP (ref 80–100)
NRBC # BLD: 0 /100 WBCS — SIGNIFICANT CHANGE UP (ref 0–0)
PHOSPHATE SERPL-MCNC: 3.4 MG/DL — SIGNIFICANT CHANGE UP (ref 2.5–4.5)
PLATELET # BLD AUTO: 204 K/UL — SIGNIFICANT CHANGE UP (ref 150–400)
POTASSIUM SERPL-MCNC: 3.9 MMOL/L — SIGNIFICANT CHANGE UP (ref 3.5–5.3)
POTASSIUM SERPL-SCNC: 3.9 MMOL/L — SIGNIFICANT CHANGE UP (ref 3.5–5.3)
PROT SERPL-MCNC: 6.5 GM/DL — SIGNIFICANT CHANGE UP (ref 6–8.3)
RBC # BLD: 4.23 M/UL — SIGNIFICANT CHANGE UP (ref 3.8–5.2)
RBC # FLD: 13.2 % — SIGNIFICANT CHANGE UP (ref 10.3–14.5)
SODIUM SERPL-SCNC: 139 MMOL/L — SIGNIFICANT CHANGE UP (ref 135–145)
WBC # BLD: 9.56 K/UL — SIGNIFICANT CHANGE UP (ref 3.8–10.5)
WBC # FLD AUTO: 9.56 K/UL — SIGNIFICANT CHANGE UP (ref 3.8–10.5)

## 2019-11-15 PROCEDURE — 99231 SBSQ HOSP IP/OBS SF/LOW 25: CPT

## 2019-11-15 PROCEDURE — 99222 1ST HOSP IP/OBS MODERATE 55: CPT

## 2019-11-15 RX ADMIN — DEXTROSE MONOHYDRATE, SODIUM CHLORIDE, AND POTASSIUM CHLORIDE 150 MILLILITER(S): 50; .745; 4.5 INJECTION, SOLUTION INTRAVENOUS at 05:38

## 2019-11-15 RX ADMIN — DEXTROSE MONOHYDRATE, SODIUM CHLORIDE, AND POTASSIUM CHLORIDE 110 MILLILITER(S): 50; .745; 4.5 INJECTION, SOLUTION INTRAVENOUS at 22:09

## 2019-11-15 RX ADMIN — HEPARIN SODIUM 5000 UNIT(S): 5000 INJECTION INTRAVENOUS; SUBCUTANEOUS at 05:38

## 2019-11-15 RX ADMIN — HEPARIN SODIUM 5000 UNIT(S): 5000 INJECTION INTRAVENOUS; SUBCUTANEOUS at 17:14

## 2019-11-15 RX ADMIN — Medication 3 MILLIGRAM(S): at 22:09

## 2019-11-15 RX ADMIN — DEXTROSE MONOHYDRATE, SODIUM CHLORIDE, AND POTASSIUM CHLORIDE 110 MILLILITER(S): 50; .745; 4.5 INJECTION, SOLUTION INTRAVENOUS at 15:30

## 2019-11-15 NOTE — PROGRESS NOTE ADULT - SUBJECTIVE AND OBJECTIVE BOX
Gastroenterology  Patient seen and examined bedside resting comfortably.  No complaints offered. Tolerating CLD. Febrile to 100.4 yesterday. Feels better today without subjective fever.     T(F): 98.8 (11-15-19 @ 05:00), Max: 100.4 (11-14-19 @ 17:15)  HR: 87 (11-15-19 @ 05:00) (87 - 99)  BP: 117/63 (11-15-19 @ 05:00) (114/74 - 133/59)  RR: 18 (11-15-19 @ 05:00) (17 - 18)  SpO2: 98% (11-15-19 @ 05:00) (98% - 100%)  Wt(kg): --  CAPILLARY BLOOD GLUCOSE          PHYSICAL EXAM:  General: NAD  Neuro:  Alert & responsive  HEENT: NCAT, EOMI, conjunctiva clear  CV: +S1+S2 regular rate and rhythm  Lung: clear to ausculation bilaterally, respirations nonlabored, good inspiratory effort  Abdomen: soft, Non Tender, No distention Normal active BS  Extremities: no cyanosis, clubbing or edema    LABS:                        11.4   9.56  )-----------( 204      ( 15 Nov 2019 06:06 )             33.9     11-15    139  |  111<H>  |  3<L>  ----------------------------<  100<H>  3.9   |  23  |  0.55    Ca    9.6      15 Nov 2019 06:06  Phos  3.4     11-15  Mg     2.0     11-15    TPro  6.5  /  Alb  2.6<L>  /  TBili  0.5  /  DBili  .11  /  AST  17  /  ALT  47  /  AlkPhos  115  11-15    LIVER FUNCTIONS - ( 15 Nov 2019 06:06 )  Alb: 2.6 g/dL / Pro: 6.5 gm/dL / ALK PHOS: 115 U/L / ALT: 47 U/L / AST: 17 U/L / GGT: x           PT/INR - ( 14 Nov 2019 06:34 )   PT: 15.9 sec;   INR: 1.40 ratio           I&O's Detail    14 Nov 2019 07:01  -  15 Nov 2019 07:00  --------------------------------------------------------  IN:    dextrose 5% + sodium chloride 0.45% with potassium chloride 20 mEq/L: 1800 mL    Oral Fluid: 360 mL  Total IN: 2160 mL    OUT:    Voided: 450 mL  Total OUT: 450 mL    Total NET: 1710 mL      15 Nov 2019 07:01  -  15 Nov 2019 10:55  --------------------------------------------------------  IN:    Oral Fluid: 250 mL  Total IN: 250 mL    OUT:  Total OUT: 0 mL    Total NET: 250 mL        11-15 @ 06:06    139 | 111 | 3  /9.6 | 2.0 | 3.4  _______________________/  3.9 | 23 | 0.55                           \par   Amylase, Serum Total: 138 U/L (11-15 @ 06:06)

## 2019-11-15 NOTE — CONSULT NOTE ADULT - SUBJECTIVE AND OBJECTIVE BOX
ERICA ROSALES   1965 DOA 11/10/2019 [facility]  [id number] DR FREDDY BURDEN   ALLERGY     nka    CONTACT     Mariia Porter  self           Initial evaluation/Pulmonary Critical Care consultation requested on  11/15/2019  by Dr Burden     from Dr Martinez   Patient examined chart reviewed    HOSPITAL ADMISSION   PATIENT CAME  FROM (if information available)      REVIEW OF SYMPTOMS      Able to give ROS  Yes     RELIABLE No   CONSTITUTIONAL Weakness Yes  Chills No Vision changes No  ENDOCRINE No unexplained hair loss No heat or cold intolerance    ALLERGY No hives  Sore throat No   RESP Coughing blood no  Shortness of breath YES   NEURO No Headache  Confusion Pain neck No   CARDIAC No Chest pain No Palpitations   GI No Pain abdomen NO   Vomiting NO     PHYSICAL EXAM    HEENT Unremarkable PERRLA atraumatic   RESP Fair air entry EXP prolonged    Harsh breath sound Resp distres mild   CARDIAC S1 S2 No S3     NO JVD    ABDOMEN SOFT BS PRESENT NOT DISTENDED No hepatosplenomegaly PEDAL EDEMA present No calf tenderness  NO rash   GENERAL Not TOXIC looking    VITALS/LABS         11/15/2019 99f 87 120/70 17 100%  11/15/2019 W 9.5 Hb 11.4 Plt 204 Na 139 K 3.9 CO2 23 Cr .5       PATIENT ERICA ROSALES   1965 DOA 11/10/2019 [facility]  [id number] FREDDY BURDEN                            PATIENT DATA   ALLERGY nka  ADVANCED DIRECTIVE       WT   76            BMI  28                                                                                                                                          HEAD OF BED ELEVATION Yes          DVT PROPHYLAXIS   hpsc (11/10)        STRESS ULCER PROPHYLAXIS  INFECTION PPLX                                                        DYSPHAGIA EVAL                                           DIET clear liq ()   IV D51/2 NS 20K 110 ()   GAS EXCHANGE 11/15/2019 ra 100%                       ECHO                               EKG                                                      CXR                               CT   ctap ic 1) Mild bl effsn 2) atelect and consolidation bl lower lobes 3) Mild pancreatitis 4) mild ascites   Hb 11/15/2019 Hb 11.4               W 11/15/2019 W 9.5  PC  Amylase LIPASE ----11/15 Amylase  [id number]  Lipase   [id number]           MICROBIO    urine culture n   blod culture n                            ANTIBIOTICS               PT DESCRIPTION     53 f admitted with pain abd No signi PMH   11/10 US showed mild wall thickening suggesting choldecystitis and labs showed pancreatitis Pt was managed conservatively for gallstone pancreatitis   Pulm consulted 11/15/2019 because of small bl effsns and consolidation lower lobes on ctap

## 2019-11-15 NOTE — PROGRESS NOTE ADULT - ASSESSMENT
ABDOMINAL PAIN with pancreatitis, + gallstones, dilated CBD 7 mm, mild wall thickening.     Recent admission for similar symptoms with elevated ast/alt 100-200, normal bili, MRCP negative for choledocholithiasis and HIDA negative for cholecystitis.     On current admission presents with abdominal pain with lipase 30k. Denies any new medications, ETOH abuse, trauma, infections.     MRCP repeated 11/13: noted cholelithiasis with pericholecystic fluid ?cholecystitis, acute pancreatitis, NO evidence of choleodocholithiasis, mild PD dilation 4 mm.     Appreciate surgery recs re: further management of cholecystitis. ABDOMINAL PAIN with pancreatitis, + gallstones, dilated CBD 7 mm, mild wall thickening.     Recent admission for similar symptoms with elevated ast/alt 100-200, normal bili, MRCP negative for choledocholithiasis and HIDA negative for cholecystitis.     On current admission presents with abdominal pain with lipase 30k. Denies any new medications, ETOH abuse, trauma, infections.     MRCP repeated 11/13: noted cholelithiasis with pericholecystic fluid ?cholecystitis, acute pancreatitis, NO evidence of choleodocholithiasis, mild PD dilation 4 mm.     Appreciate surgery recs re: further management of cholecystitis. Surgery delayed given fever 100.4 yesterday.

## 2019-11-15 NOTE — CONSULT NOTE ADULT - ASSESSMENT
PT DATA ASSESSMENT RECOMMENDATION DETAILS         RO PNEUMONIA   A/R Basal consolidation or atelectasis on ct chest likely represent compressive atelectasis from pl effsns   If fever leukocytosis will Rx as aspiration pneumonia Will check mrsa meanwhile     PLEURAL EFFSNS   Pl effsns likely secondary to pancreatic inflammation   If persist willget doiagnostic thoraxc on larger l effsn     RESP GAS EXCHANGE   Monitor po Keep po betw 90 and 95%       TIME SPENT Over 55 minutes aggregate care time spent on encounter; activities included   direct pt care, counseling and/or coordinating care reviewing notes, lab data/ imaging , discussion with multidisciplinary team/ pt /family. Risks, benefits, alternatives  discussed in detail.

## 2019-11-15 NOTE — PROGRESS NOTE ADULT - PROBLEM SELECTOR PLAN 1
-Appreciate surgery recommendations.  -Advanced diet as tolerated  -IV hydration   -w/u including TG, IGG4  -LFTs daily. -Appreciate surgery recommendations.  -Infectious w/u given fever   -Advanced diet as tolerated  -IV hydration   -w/u including TG, IGG4  -LFTs daily.

## 2019-11-15 NOTE — PROGRESS NOTE ADULT - SUBJECTIVE AND OBJECTIVE BOX
Patient seen and examined at bedside resting comfortably.   No complaints offered. +fevers yesterday, afebrile overnight  Abdominal pain is well controlled with meds.  Denies nausea and vomiting. Tolerating diet.  Denies chest pain, dyspnea, cough.    T(F): 98.8 (11-15-19 @ 05:00), Max: 100.4 (11-14-19 @ 17:15)  HR: 87 (11-15-19 @ 05:00) (87 - 99)  BP: 117/63 (11-15-19 @ 05:00) (114/74 - 133/59)  RR: 18 (11-15-19 @ 05:00) (17 - 18)  SpO2: 98% (11-15-19 @ 05:00) (98% - 100%)  Wt(kg): --  CAPILLARY BLOOD GLUCOSE      GENERAL: Alert, NAD  CHEST/LUNG: Clear to auscultation bilaterally, respirations nonlabored  HEART: S1S2, Regular rate and rhythm;   ABDOMEN: +Bowel sounds, soft, nontender, Nondistended  EXTREMITIES:  no calf tenderness, No edema    LABS:                        11.4   9.56  )-----------( 204      ( 15 Nov 2019 06:06 )             33.9     11-15    139  |  111<H>  |  3<L>  ----------------------------<  100<H>  3.9   |  23  |  0.55    Ca    9.6      15 Nov 2019 06:06  Phos  3.4     11-15  Mg     2.0     11-15    TPro  6.5  /  Alb  2.6<L>  /  TBili  0.5  /  DBili  .11  /  AST  17  /  ALT  47  /  AlkPhos  115  11-15    PT/INR - ( 14 Nov 2019 06:34 )   PT: 15.9 sec;   INR: 1.40 ratio        I&O's Detail    14 Nov 2019 07:01  -  15 Nov 2019 07:00  --------------------------------------------------------  IN:    dextrose 5% + sodium chloride 0.45% with potassium chloride 20 mEq/L: 1800 mL    Oral Fluid: 360 mL  Total IN: 2160 mL    OUT:    Voided: 450 mL  Total OUT: 450 mL    Total NET: 1710 mL        Culture Results:   No growth (11-11 @ 06:57)  Culture Results:   No growth to date. (11-11 @ 00:54)  Culture Results:   No growth to date. (11-11 @ 00:54)    < from: CT Abdomen and Pelvis w/ IV Cont (11.14.19 @ 09:25) >  Findings: Limited sections through the lung bases demonstrate mild   bilateral pleural effusions, left greater than right. Combination of   atelectasis and consolidation in the lower lobes bilaterally. Small right   middle lobe atelectasis.     A few small hypodenselesions in the liver, too small to characterize.   The gallstones noted on MRCP dated 11/14/2019 and abdominal ultrasound   dated 11/10/2019 cannot be seen at CT. Pericholecystic fluid. The common   bile duct is not dilated.     Mild peripancreatic stranding and fluid, suggestive of acute   pancreatitis. Clinical correlation with pancreatic enzymes is   recommended. No evidence for pancreatic necrosis.    < end of copied text >  < from: MR MRCP No Cont (11.14.19 @ 09:27) >  Impression: Mild bilateral pleural effusions.    Cholelithiasis with pericholecystic fluid which may be part of the   ascites or may be due to acute cholecystitis. Clinical correlation is   recommended. No suspicious filling defect in the common bile duct to   suggest choledocholithiasis.    Nonspecific slight main pancreatic duct dilatation in the pancreatic head   measuring up to 4 mm in diameter.     New mild peripancreatic edema and fluid, suggestive of acute   pancreatitis. Clinical correction with pancreatic enzymes is recommended.    < end of copied text >      Impression: 54 y/o female admitted with gallstone pancreatitis, s/p normal HIDA and MRCP with no choledocholithiasis on prior admission last week.    Leukocytosis resolved, LFT improving, persistent lowgrade temps, now with b/l small pleural effussions     Plan:  - lap keyla deferred at this time as pt febrile  - f/u labs, continue to trend LFTs  - DVT prophylaxis, OOB, Ambulating, pain control  - Will discuss with Dr. Burden Patient seen and examined at bedside resting comfortably.   No complaints offered. +fevers yesterday, afebrile overnight  Abdominal pain is well controlled with meds.  Denies nausea and vomiting. Tolerating diet.  Denies chest pain, dyspnea, cough.    T(F): 98.8 (11-15-19 @ 05:00), Max: 100.4 (11-14-19 @ 17:15)  HR: 87 (11-15-19 @ 05:00) (87 - 99)  BP: 117/63 (11-15-19 @ 05:00) (114/74 - 133/59)  RR: 18 (11-15-19 @ 05:00) (17 - 18)  SpO2: 98% (11-15-19 @ 05:00) (98% - 100%)  Wt(kg): --  CAPILLARY BLOOD GLUCOSE      GENERAL: Alert, NAD  CHEST/LUNG: Clear to auscultation bilaterally, respirations nonlabored  HEART: S1S2, Regular rate and rhythm;   ABDOMEN: +Bowel sounds, soft, nontender, Nondistended  EXTREMITIES:  no calf tenderness, No edema    LABS:                        11.4   9.56  )-----------( 204      ( 15 Nov 2019 06:06 )             33.9     11-15    139  |  111<H>  |  3<L>  ----------------------------<  100<H>  3.9   |  23  |  0.55    Ca    9.6      15 Nov 2019 06:06  Phos  3.4     11-15  Mg     2.0     11-15    TPro  6.5  /  Alb  2.6<L>  /  TBili  0.5  /  DBili  .11  /  AST  17  /  ALT  47  /  AlkPhos  115  11-15    PT/INR - ( 14 Nov 2019 06:34 )   PT: 15.9 sec;   INR: 1.40 ratio        I&O's Detail    14 Nov 2019 07:01  -  15 Nov 2019 07:00  --------------------------------------------------------  IN:    dextrose 5% + sodium chloride 0.45% with potassium chloride 20 mEq/L: 1800 mL    Oral Fluid: 360 mL  Total IN: 2160 mL    OUT:    Voided: 450 mL  Total OUT: 450 mL    Total NET: 1710 mL        Culture Results:   No growth (11-11 @ 06:57)  Culture Results:   No growth to date. (11-11 @ 00:54)  Culture Results:   No growth to date. (11-11 @ 00:54)    < from: CT Abdomen and Pelvis w/ IV Cont (11.14.19 @ 09:25) >  Findings: Limited sections through the lung bases demonstrate mild   bilateral pleural effusions, left greater than right. Combination of   atelectasis and consolidation in the lower lobes bilaterally. Small right   middle lobe atelectasis.     A few small hypodenselesions in the liver, too small to characterize.   The gallstones noted on MRCP dated 11/14/2019 and abdominal ultrasound   dated 11/10/2019 cannot be seen at CT. Pericholecystic fluid. The common   bile duct is not dilated.     Mild peripancreatic stranding and fluid, suggestive of acute   pancreatitis. Clinical correlation with pancreatic enzymes is   recommended. No evidence for pancreatic necrosis.    < end of copied text >  < from: MR MRCP No Cont (11.14.19 @ 09:27) >  Impression: Mild bilateral pleural effusions.    Cholelithiasis with pericholecystic fluid which may be part of the   ascites or may be due to acute cholecystitis. Clinical correlation is   recommended. No suspicious filling defect in the common bile duct to   suggest choledocholithiasis.    Nonspecific slight main pancreatic duct dilatation in the pancreatic head   measuring up to 4 mm in diameter.     New mild peripancreatic edema and fluid, suggestive of acute   pancreatitis. Clinical correction with pancreatic enzymes is recommended.    < end of copied text >      Impression: 54 y/o female admitted with gallstone pancreatitis, s/p normal HIDA and MRCP with no choledocholithiasis on prior admission last week.    Leukocytosis resolved, LFT improving, persistent lowgrade temps, now with b/l small pleural effussions     Plan:  - lap keyla deferred at this time as pt febrile  - f/u labs, continue to trend LFTs  - decrease IVF  - DVT prophylaxis, OOB, Ambulating, pain control  - Will discuss with Dr. Burden

## 2019-11-16 LAB
ALBUMIN SERPL ELPH-MCNC: 2.8 G/DL — LOW (ref 3.3–5)
ALP SERPL-CCNC: 107 U/L — SIGNIFICANT CHANGE UP (ref 40–120)
ALT FLD-CCNC: 52 U/L — SIGNIFICANT CHANGE UP (ref 12–78)
AMYLASE P1 CFR SERPL: 144 U/L — HIGH (ref 25–115)
ANION GAP SERPL CALC-SCNC: 7 MMOL/L — SIGNIFICANT CHANGE UP (ref 5–17)
AST SERPL-CCNC: 23 U/L — SIGNIFICANT CHANGE UP (ref 15–37)
BASOPHILS # BLD AUTO: 0.02 K/UL — SIGNIFICANT CHANGE UP (ref 0–0.2)
BASOPHILS NFR BLD AUTO: 0.3 % — SIGNIFICANT CHANGE UP (ref 0–2)
BILIRUB SERPL-MCNC: 0.4 MG/DL — SIGNIFICANT CHANGE UP (ref 0.2–1.2)
BUN SERPL-MCNC: 4 MG/DL — LOW (ref 7–23)
CALCIUM SERPL-MCNC: 9.5 MG/DL — SIGNIFICANT CHANGE UP (ref 8.5–10.1)
CHLORIDE SERPL-SCNC: 110 MMOL/L — HIGH (ref 96–108)
CO2 SERPL-SCNC: 23 MMOL/L — SIGNIFICANT CHANGE UP (ref 22–31)
CREAT SERPL-MCNC: 0.65 MG/DL — SIGNIFICANT CHANGE UP (ref 0.5–1.3)
CULTURE RESULTS: SIGNIFICANT CHANGE UP
CULTURE RESULTS: SIGNIFICANT CHANGE UP
EOSINOPHIL # BLD AUTO: 0.23 K/UL — SIGNIFICANT CHANGE UP (ref 0–0.5)
EOSINOPHIL NFR BLD AUTO: 3.2 % — SIGNIFICANT CHANGE UP (ref 0–6)
GLUCOSE SERPL-MCNC: 80 MG/DL — SIGNIFICANT CHANGE UP (ref 70–99)
HCG SERPL-ACNC: <1 MIU/ML — SIGNIFICANT CHANGE UP
HCT VFR BLD CALC: 34.6 % — SIGNIFICANT CHANGE UP (ref 34.5–45)
HGB BLD-MCNC: 11.7 G/DL — SIGNIFICANT CHANGE UP (ref 11.5–15.5)
IMM GRANULOCYTES NFR BLD AUTO: 0.3 % — SIGNIFICANT CHANGE UP (ref 0–1.5)
INR BLD: 1.25 RATIO — HIGH (ref 0.88–1.16)
LIDOCAIN IGE QN: 777 U/L — HIGH (ref 73–393)
LYMPHOCYTES # BLD AUTO: 1.54 K/UL — SIGNIFICANT CHANGE UP (ref 1–3.3)
LYMPHOCYTES # BLD AUTO: 21.5 % — SIGNIFICANT CHANGE UP (ref 13–44)
MCHC RBC-ENTMCNC: 26.8 PG — LOW (ref 27–34)
MCHC RBC-ENTMCNC: 33.8 GM/DL — SIGNIFICANT CHANGE UP (ref 32–36)
MCV RBC AUTO: 79.2 FL — LOW (ref 80–100)
MONOCYTES # BLD AUTO: 0.79 K/UL — SIGNIFICANT CHANGE UP (ref 0–0.9)
MONOCYTES NFR BLD AUTO: 11 % — SIGNIFICANT CHANGE UP (ref 2–14)
NEUTROPHILS # BLD AUTO: 4.57 K/UL — SIGNIFICANT CHANGE UP (ref 1.8–7.4)
NEUTROPHILS NFR BLD AUTO: 63.7 % — SIGNIFICANT CHANGE UP (ref 43–77)
NRBC # BLD: 0 /100 WBCS — SIGNIFICANT CHANGE UP (ref 0–0)
PHOSPHATE SERPL-MCNC: 4 MG/DL — SIGNIFICANT CHANGE UP (ref 2.5–4.5)
PLATELET # BLD AUTO: 246 K/UL — SIGNIFICANT CHANGE UP (ref 150–400)
POTASSIUM SERPL-MCNC: 3.9 MMOL/L — SIGNIFICANT CHANGE UP (ref 3.5–5.3)
POTASSIUM SERPL-SCNC: 3.9 MMOL/L — SIGNIFICANT CHANGE UP (ref 3.5–5.3)
PROCALCITONIN SERPL-MCNC: 0.05 NG/ML — SIGNIFICANT CHANGE UP (ref 0.02–0.1)
PROT SERPL-MCNC: 7.1 GM/DL — SIGNIFICANT CHANGE UP (ref 6–8.3)
PROTHROM AB SERPL-ACNC: 14.1 SEC — HIGH (ref 10–12.9)
RBC # BLD: 4.37 M/UL — SIGNIFICANT CHANGE UP (ref 3.8–5.2)
RBC # FLD: 13.2 % — SIGNIFICANT CHANGE UP (ref 10.3–14.5)
SODIUM SERPL-SCNC: 140 MMOL/L — SIGNIFICANT CHANGE UP (ref 135–145)
SPECIMEN SOURCE: SIGNIFICANT CHANGE UP
SPECIMEN SOURCE: SIGNIFICANT CHANGE UP
WBC # BLD: 7.17 K/UL — SIGNIFICANT CHANGE UP (ref 3.8–10.5)
WBC # FLD AUTO: 7.17 K/UL — SIGNIFICANT CHANGE UP (ref 3.8–10.5)

## 2019-11-16 PROCEDURE — 99231 SBSQ HOSP IP/OBS SF/LOW 25: CPT

## 2019-11-16 PROCEDURE — 99232 SBSQ HOSP IP/OBS MODERATE 35: CPT

## 2019-11-16 PROCEDURE — 71046 X-RAY EXAM CHEST 2 VIEWS: CPT | Mod: 26

## 2019-11-16 RX ADMIN — HEPARIN SODIUM 5000 UNIT(S): 5000 INJECTION INTRAVENOUS; SUBCUTANEOUS at 17:19

## 2019-11-16 RX ADMIN — HEPARIN SODIUM 5000 UNIT(S): 5000 INJECTION INTRAVENOUS; SUBCUTANEOUS at 05:52

## 2019-11-16 RX ADMIN — Medication 650 MILLIGRAM(S): at 04:07

## 2019-11-16 RX ADMIN — Medication 650 MILLIGRAM(S): at 05:00

## 2019-11-16 RX ADMIN — DEXTROSE MONOHYDRATE, SODIUM CHLORIDE, AND POTASSIUM CHLORIDE 110 MILLILITER(S): 50; .745; 4.5 INJECTION, SOLUTION INTRAVENOUS at 12:28

## 2019-11-16 RX ADMIN — DEXTROSE MONOHYDRATE, SODIUM CHLORIDE, AND POTASSIUM CHLORIDE 110 MILLILITER(S): 50; .745; 4.5 INJECTION, SOLUTION INTRAVENOUS at 17:18

## 2019-11-16 NOTE — PROGRESS NOTE ADULT - SUBJECTIVE AND OBJECTIVE BOX
BOBBY MALDONADO    S 2E [address] D    Patient is a 53y old  Female who presents with a chief complaint of gallstone pancreatitis (16 Nov 2019 06:04)       Allergies    No Known Allergies    Intolerances        HPI:  52 y/o female discharged yesterday from surgery's service after being conservatively managed for RUQ pain with findings of cholelithiasis without signs of acute cholecystitis, returned to ED this morning with 9/10 crampy intermittent abdominal pain now to the LUQ radiating to the LLQ.  Pt's reports no inciting or alleviating factors.  She was sitting at home last evening eating cookies, tea, and an orange when the pain suddenly began.  Pt reports last BM was this morning at 8 am and was normal.  Denies nausea or vomiting.  Pt denies fever, chills, shortness of breath, chest pain. (10 Nov 2019 19:44)      PAST MEDICAL & SURGICAL HISTORY:  No pertinent past medical history  H/O post-sterilization tuboplasty      FAMILY HISTORY:  Family history of diabetes mellitus (DM)  FH: HTN (hypertension)        MEDICATIONS   acetaminophen   Tablet .. 650 milliGRAM(s) Oral every 6 hours PRN  dextrose 5% + sodium chloride 0.45% with potassium chloride 20 mEq/L 1000 milliLiter(s) IV Continuous <Continuous>  guaiFENesin    Syrup 200 milliGRAM(s) Oral every 6 hours PRN  heparin  Injectable 5000 Unit(s) SubCutaneous every 12 hours  melatonin 3 milliGRAM(s) Oral at bedtime PRN  morphine  - Injectable 2 milliGRAM(s) IV Push every 4 hours PRN  morphine  - Injectable 4 milliGRAM(s) IV Push every 4 hours PRN  ondansetron Injectable 4 milliGRAM(s) IV Push every 6 hours PRN  ondansetron Injectable 4 milliGRAM(s) IV Push once PRN      Vital Signs Last 24 Hrs  T(C): 37 (16 Nov 2019 17:20), Max: 37.7 (16 Nov 2019 14:27)  T(F): 98.6 (16 Nov 2019 17:20), Max: 99.9 (16 Nov 2019 14:27)  HR: 89 (16 Nov 2019 17:20) (74 - 89)  BP: 113/61 (16 Nov 2019 17:20) (105/61 - 123/70)  BP(mean): --  RR: 17 (16 Nov 2019 17:20) (16 - 17)  SpO2: 99% (16 Nov 2019 17:20) (98% - 99%)      11-15-19 @ 07:01  -  11-16-19 @ 07:00  --------------------------------------------------------  IN: 1800 mL / OUT: 0 mL / NET: 1800 mL    11-16-19 @ 07:01  -  11-16-19 @ 19:51  --------------------------------------------------------  IN: 1100 mL / OUT: 0 mL / NET: 1100 mL            LABS:                        11.7   7.17  )-----------( 246      ( 16 Nov 2019 06:55 )             34.6     11-16    140  |  110<H>  |  4<L>  ----------------------------<  80  3.9   |  23  |  0.65    Ca    9.5      16 Nov 2019 06:55  Phos  4.0     11-16  Mg     2.0     11-15    TPro  7.1  /  Alb  2.8<L>  /  TBili  0.4  /  DBili  x   /  AST  23  /  ALT  52  /  AlkPhos  107  11-16    PT/INR - ( 16 Nov 2019 06:55 )   PT: 14.1 sec;   INR: 1.25 ratio                   WBC:  WBC Count: 7.17 K/uL (11-16 @ 06:55)  WBC Count: 9.56 K/uL (11-15 @ 06:06)  WBC Count: 9.13 K/uL (11-14 @ 06:34)  WBC Count: 10.77 K/uL (11-13 @ 07:17)      MICROBIOLOGY:  RECENT CULTURES:  11-11 .Urine Catheterized XXXX XXXX   No growth    11-11 .Blood Blood-Peripheral XXXX XXXX   No growth at 5 days.                PT/INR - ( 16 Nov 2019 06:55 )   PT: 14.1 sec;   INR: 1.25 ratio             Sodium:  Sodium, Serum: 140 mmol/L (11-16 @ 06:55)  Sodium, Serum: 139 mmol/L (11-15 @ 06:06)  Sodium, Serum: 138 mmol/L (11-14 @ 06:34)  Sodium, Serum: 139 mmol/L (11-13 @ 07:17)      0.65 mg/dL 11-16 @ 06:55  0.55 mg/dL 11-15 @ 06:06  0.52 mg/dL 11-14 @ 06:34  0.56 mg/dL 11-13 @ 07:17      Hemoglobin:  Hemoglobin: 11.7 g/dL (11-16 @ 06:55)  Hemoglobin: 11.4 g/dL (11-15 @ 06:06)  Hemoglobin: 11.6 g/dL (11-14 @ 06:34)  Hemoglobin: 12.4 g/dL (11-13 @ 07:17)      Platelets: Platelet Count - Automated: 246 K/uL (11-16 @ 06:55)  Platelet Count - Automated: 204 K/uL (11-15 @ 06:06)  Platelet Count - Automated: 166 K/uL (11-14 @ 06:34)  Platelet Count - Automated: 162 K/uL (11-13 @ 07:17)      LIVER FUNCTIONS - ( 16 Nov 2019 06:55 )  Alb: 2.8 g/dL / Pro: 7.1 gm/dL / ALK PHOS: 107 U/L / ALT: 52 U/L / AST: 23 U/L / GGT: x                 RADIOLOGY & ADDITIONAL STUDIES:

## 2019-11-16 NOTE — PROGRESS NOTE ADULT - SUBJECTIVE AND OBJECTIVE BOX
Pt seen and examined at bedside, no issues overnight, afebrile. Denies abdominal pain, no CP/SOB or dyspnea. Tolerating clear liquids.    T(F): 98.5 (11-16-19 @ 05:31), Max: 99.8 (11-15-19 @ 17:00)  HR: 74 (11-16-19 @ 05:31) (74 - 87)  BP: 105/61 (11-16-19 @ 05:31) (104/62 - 121/71)  RR: 16 (11-16-19 @ 05:31) (16 - 17)  SpO2: 99% (11-16-19 @ 05:31) (98% - 100%)  Wt(kg): --  CAPILLARY BLOOD GLUCOSE          PHYSICAL EXAM:  General: NAD, WDWN  Neuro:  Alert & oriented x 3  HEENT: NCAT, EOMI, conjunctiva clear  CV: +S1+S2 regular rate and rhythm  Lung: clear to ausculation bilaterally, respirations nonlabored, good inspiratory effort  Abdomen: soft, NTND. Normoactive BS  Extremities: no pedal edema or calf tenderness noted     LABS: Pending                 I&O's Detail    15 Nov 2019 07:01  -  16 Nov 2019 07:00  --------------------------------------------------------  IN:    dextrose 5% + sodium chloride 0.45% with potassium chloride 20 mEq/L: 1300 mL    Oral Fluid: 500 mL  Total IN: 1800 mL    OUT:  Total OUT: 0 mL    Total NET: 1800 mL        Culture Results:   No growth (11-11 @ 06:57)  Culture Results:   No growth at 5 days. (11-11 @ 00:54)  Culture Results:   No growth at 5 days. (11-11 @ 00:54)      Impression: 52 y/o female admitted with gallstone pancreatitis, s/p normal HIDA and MRCP with no choledocholithiasis on prior admission last week.    Leukocytosis resolved, LFT improving, Afebrile, now with b/l small pleural effusions     Plan:  - f/u CXR  - f/u labs, continue to trend LFTs and pancreatic enzymes  - advance diet  - DVT prophylaxis, OOB, Ambulating, pain control  - once pleural effusion resolves, cleared from Pulm POV and pancreatitis resolved will schedule Lap keyla electively  - Will discuss with Dr. Burden

## 2019-11-16 NOTE — PROGRESS NOTE ADULT - ASSESSMENT
REVIEW OF SYMPTOMS      Able to give ROS  Yes     RELIABLE No   CONSTITUTIONAL Weakness Yes  Chills No Vision changes No  ENDOCRINE No unexplained hair loss No heat or cold intolerance    ALLERGY No hives  Sore throat No   RESP Coughing blood no  Shortness of breath YES   NEURO No Headache  Confusion Pain neck No   CARDIAC No Chest pain No Palpitations   GI No Pain abdomen NO   Vomiting NO     PHYSICAL EXAM    HEENT Unremarkable PERRLA atraumatic   RESP Fair air entry EXP prolonged    Harsh breath sound Resp distres mild   CARDIAC S1 S2 No S3     NO JVD    ABDOMEN SOFT BS PRESENT NOT DISTENDED No hepatosplenomegaly PEDAL EDEMA present No calf tenderness  NO rash   GENERAL Not TOXIC looking    VITALS/LABS       2019 afeb 74 120/70 17 99%   2019 W 7.1 Hb 11 Plt 246 inr 125 Na 140 K 3.9 CO2 23 Cr .6      PATIENT CHAMBERS BOBBY   1965 DOA 11/10/2019 [facility]  [id number] FREDDY BARKLEY                            PATIENT DATA   ALLERGY nka  ADVANCED DIRECTIVE       WT   76            BMI  28                                                                                                                                          HEAD OF BED ELEVATION Yes          DVT PROPHYLAXIS   hpsc (11/10)        STRESS ULCER PROPHYLAXIS  INFECTION PPLX                                                        DYSPHAGIA EVAL                                           DIET clear liq ()   IV D51/2 NS 20K 110 ()   GAS EXCHANGE 11/15/2019 ra 100%                                                                            CXR   2019 cxr sm bl effs                             CT   ctap ic 1) Mild bl effsn 2) atelect and consolidation bl lower lobes 3) Mild pancreatitis 4) mild ascites   Hb 11/ Hb 11.4 -11              W 11/ W 9.5-7.1  PC 2019 pc n   Amylase LIPASE ----11/15 - Amylase  [id number]  Lipase   [id number]          MICROBIO    urine culture n   blod culture n                              PT DESCRIPTION     53 f admitted with pain abd No signi PMH   11/10 US showed mild wall thickening suggesting choldecystitis and labs showed pancreatitis Pt was managed conservatively for gallstone pancreatitis   Pulm consulted 11/15/2019 because of small bl effsns and consolidation lower lobes on ctap       PT DATA ASSESSMENT RECOMMENDATION DETAILS         RO PNEUMONIA   A/R Basal consolidation or atelectasis on ct chest likely represent compressive atelectasis from pl effsns   If fever leukocytosis will Rx as aspiration pneumonia Will check mrsa meanwhile    pc was neg which indicates pt does not have bact pneumon    PLEURAL EFFSNS   Pl effsns likely secondary to pancreatic inflammation   If persist willget diagnostic thoraxc on larger l effsn     RESP GAS EXCHANGE   Monitor po Keep po betw 90 and 95%       GALL STONE PANCREATITIS   Cholecystectimy being planned       TIME SPENT Over 25 minutes aggregate care time spent on encounter; activities included   direct pt care, counseling and/or coordinating care reviewing notes, lab data/ imaging , discussion with multidisciplinary team/ pt /family. Risks, benefits, alternatives  discussed in detail.

## 2019-11-17 LAB
ALBUMIN SERPL ELPH-MCNC: 3 G/DL — LOW (ref 3.3–5)
ALP SERPL-CCNC: 107 U/L — SIGNIFICANT CHANGE UP (ref 40–120)
ALT FLD-CCNC: 52 U/L — SIGNIFICANT CHANGE UP (ref 12–78)
AMYLASE P1 CFR SERPL: 145 U/L — HIGH (ref 25–115)
ANION GAP SERPL CALC-SCNC: 5 MMOL/L — SIGNIFICANT CHANGE UP (ref 5–17)
AST SERPL-CCNC: 32 U/L — SIGNIFICANT CHANGE UP (ref 15–37)
BASOPHILS # BLD AUTO: 0.04 K/UL — SIGNIFICANT CHANGE UP (ref 0–0.2)
BASOPHILS NFR BLD AUTO: 0.5 % — SIGNIFICANT CHANGE UP (ref 0–2)
BILIRUB DIRECT SERPL-MCNC: 0.1 MG/DL — SIGNIFICANT CHANGE UP (ref 0.05–0.2)
BILIRUB INDIRECT FLD-MCNC: 0.3 MG/DL — SIGNIFICANT CHANGE UP (ref 0.2–1)
BILIRUB SERPL-MCNC: 0.4 MG/DL — SIGNIFICANT CHANGE UP (ref 0.2–1.2)
BUN SERPL-MCNC: 4 MG/DL — LOW (ref 7–23)
CALCIUM SERPL-MCNC: 9.7 MG/DL — SIGNIFICANT CHANGE UP (ref 8.5–10.1)
CHLORIDE SERPL-SCNC: 111 MMOL/L — HIGH (ref 96–108)
CO2 SERPL-SCNC: 26 MMOL/L — SIGNIFICANT CHANGE UP (ref 22–31)
CREAT SERPL-MCNC: 0.72 MG/DL — SIGNIFICANT CHANGE UP (ref 0.5–1.3)
EOSINOPHIL # BLD AUTO: 0.2 K/UL — SIGNIFICANT CHANGE UP (ref 0–0.5)
EOSINOPHIL NFR BLD AUTO: 2.7 % — SIGNIFICANT CHANGE UP (ref 0–6)
GLUCOSE SERPL-MCNC: 82 MG/DL — SIGNIFICANT CHANGE UP (ref 70–99)
HCT VFR BLD CALC: 37.1 % — SIGNIFICANT CHANGE UP (ref 34.5–45)
HGB BLD-MCNC: 12.2 G/DL — SIGNIFICANT CHANGE UP (ref 11.5–15.5)
IMM GRANULOCYTES NFR BLD AUTO: 0.5 % — SIGNIFICANT CHANGE UP (ref 0–1.5)
LEGIONELLA AG UR QL: NEGATIVE — SIGNIFICANT CHANGE UP
LIDOCAIN IGE QN: 841 U/L — HIGH (ref 73–393)
LYMPHOCYTES # BLD AUTO: 2 K/UL — SIGNIFICANT CHANGE UP (ref 1–3.3)
LYMPHOCYTES # BLD AUTO: 27.1 % — SIGNIFICANT CHANGE UP (ref 13–44)
MCHC RBC-ENTMCNC: 26.4 PG — LOW (ref 27–34)
MCHC RBC-ENTMCNC: 32.9 GM/DL — SIGNIFICANT CHANGE UP (ref 32–36)
MCV RBC AUTO: 80.3 FL — SIGNIFICANT CHANGE UP (ref 80–100)
MONOCYTES # BLD AUTO: 0.83 K/UL — SIGNIFICANT CHANGE UP (ref 0–0.9)
MONOCYTES NFR BLD AUTO: 11.2 % — SIGNIFICANT CHANGE UP (ref 2–14)
MRSA PCR RESULT.: SIGNIFICANT CHANGE UP
NEUTROPHILS # BLD AUTO: 4.28 K/UL — SIGNIFICANT CHANGE UP (ref 1.8–7.4)
NEUTROPHILS NFR BLD AUTO: 58 % — SIGNIFICANT CHANGE UP (ref 43–77)
NRBC # BLD: 0 /100 WBCS — SIGNIFICANT CHANGE UP (ref 0–0)
PLATELET # BLD AUTO: 289 K/UL — SIGNIFICANT CHANGE UP (ref 150–400)
POTASSIUM SERPL-MCNC: 3.8 MMOL/L — SIGNIFICANT CHANGE UP (ref 3.5–5.3)
POTASSIUM SERPL-SCNC: 3.8 MMOL/L — SIGNIFICANT CHANGE UP (ref 3.5–5.3)
PROT SERPL-MCNC: 7.7 GM/DL — SIGNIFICANT CHANGE UP (ref 6–8.3)
RBC # BLD: 4.62 M/UL — SIGNIFICANT CHANGE UP (ref 3.8–5.2)
RBC # FLD: 13.2 % — SIGNIFICANT CHANGE UP (ref 10.3–14.5)
S AUREUS DNA NOSE QL NAA+PROBE: SIGNIFICANT CHANGE UP
SODIUM SERPL-SCNC: 142 MMOL/L — SIGNIFICANT CHANGE UP (ref 135–145)
WBC # BLD: 7.39 K/UL — SIGNIFICANT CHANGE UP (ref 3.8–10.5)
WBC # FLD AUTO: 7.39 K/UL — SIGNIFICANT CHANGE UP (ref 3.8–10.5)

## 2019-11-17 PROCEDURE — 99232 SBSQ HOSP IP/OBS MODERATE 35: CPT

## 2019-11-17 PROCEDURE — 99231 SBSQ HOSP IP/OBS SF/LOW 25: CPT

## 2019-11-17 RX ADMIN — HEPARIN SODIUM 5000 UNIT(S): 5000 INJECTION INTRAVENOUS; SUBCUTANEOUS at 17:26

## 2019-11-17 RX ADMIN — HEPARIN SODIUM 5000 UNIT(S): 5000 INJECTION INTRAVENOUS; SUBCUTANEOUS at 05:41

## 2019-11-17 RX ADMIN — DEXTROSE MONOHYDRATE, SODIUM CHLORIDE, AND POTASSIUM CHLORIDE 110 MILLILITER(S): 50; .745; 4.5 INJECTION, SOLUTION INTRAVENOUS at 19:38

## 2019-11-17 RX ADMIN — DEXTROSE MONOHYDRATE, SODIUM CHLORIDE, AND POTASSIUM CHLORIDE 110 MILLILITER(S): 50; .745; 4.5 INJECTION, SOLUTION INTRAVENOUS at 05:41

## 2019-11-17 NOTE — PROGRESS NOTE ADULT - ASSESSMENT
REVIEW OF SYMPTOMS      Able to give ROS  Yes     RELIABLE No   CONSTITUTIONAL Weakness Yes  Chills No Vision changes No  ENDOCRINE No unexplained hair loss No heat or cold intolerance    ALLERGY No hives  Sore throat No   RESP Coughing blood no  Shortness of breath YES   NEURO No Headache  Confusion Pain neck No   CARDIAC No Chest pain No Palpitations   GI No Pain abdomen NO   Vomiting NO     PHYSICAL EXAM    HEENT Unremarkable PERRLA atraumatic   RESP Fair air entry EXP prolonged    Harsh breath sound Resp distres mild   CARDIAC S1 S2 No S3     NO JVD    ABDOMEN SOFT BS PRESENT NOT DISTENDED No hepatosplenomegaly PEDAL EDEMA present No calf tenderness  NO rash   GENERAL Not TOXIC looking    VITALS/LABS       2019 afeb 69 108/67 18 100%   2019 W 7.3 Hb 12.2 Plt 289 Na 142 K 3.8 CO2 26 Cr .7     PATIENT CHAMBERS BOBBY   1965 DOA 11/10/2019 [facility]  [id number] FREDDY BARKLEY                            PATIENT DATA   ALLERGY nka  ADVANCED DIRECTIVE       WT   76            BMI  28                                                                                                                                          HEAD OF BED ELEVATION Yes          DVT PROPHYLAXIS   hpsc (11/10)        STRESS ULCER PROPHYLAXIS  INFECTION PPLX                                                        DYSPHAGIA EVAL                                           DIET clear liq ()   IV D51/2 NS 20K 110 ()   GAS EXCHANGE 11/15/2019 ra 100%                                                                            CXR   2019 cxr sm bl effs                             CT   ctap ic 1) Mild bl effsn 2) atelect and consolidation bl lower lobes 3) Mild pancreatitis 4) mild ascites   Hb 11/ Hb 11.4 -11              W 11/ W 9.5-7.1  PC 2019 pc n   Amylase LIPASE ----11/15 --- Amylase  [id number]  Lipase   [id number]           MICROBIO    urine culture n   blod culture n   mrsa n  leg n                             PT DESCRIPTION     53 f admitted with pain abd No signi PMH   11/10 US showed mild wall thickening suggesting choldecystitis and labs showed pancreatitis Pt was managed conservatively for gallstone pancreatitis   Pulm consulted 11/15/2019 because of small bl effsns and consolidation lower lobes on ctap       PT DATA ASSESSMENT RECOMMENDATION DETAILS         RO PNEUMONIA   A/R Basal consolidation or atelectasis on ct chest likely represent compressive atelectasis from pl effsns   If fever leukocytosis will Rx as aspiration pneumonia Will check mrsa meanwhile    pc was neg which indicates pt does not have bact pneumon    PULMONARY CLEARANCE   Pt is in optimal pulm shape and may goi for gb surgery if indicated     PLEURAL EFFSNS   Pl effsns likely secondary to pancreatic inflammation   If persist or increased  willget diagnostic thoraxc on larger l effsn     RESP GAS EXCHANGE   Monitor po Keep po betw 90 and 95%       GALL STONE PANCREATITIS   Cholecystectimy being planned       TIME SPENT Over 25 minutes aggregate care time spent on encounter; activities included   direct pt care, counseling and/or coordinating care reviewing notes, lab data/ imaging , discussion with multidisciplinary team/ pt /family. Risks, benefits, alternatives  discussed in detail.

## 2019-11-17 NOTE — PROGRESS NOTE ADULT - SUBJECTIVE AND OBJECTIVE BOX
Pt seen and examined at bedside, no issues overnight, afebrile. Denies abdominal pain, no CP/SOB or dyspnea. Tolerating clear liquids.    T(F): 98.4 (11-17-19 @ 05:33), Max: 99.9 (11-16-19 @ 14:27)  HR: 73 (11-17-19 @ 05:33) (73 - 89)  BP: 96/60 (11-17-19 @ 05:33) (96/60 - 123/70)  RR: 16 (11-17-19 @ 05:33) (16 - 17)  SpO2: 98% (11-17-19 @ 05:33) (98% - 99%)  Wt(kg): --  CAPILLARY BLOOD GLUCOSE    PHYSICAL EXAM:  General: NAD, WDWN  Neuro:  Alert & oriented x 3  HEENT: NCAT, EOMI, conjunctiva clear  CV: +S1+S2 regular rate and rhythm  Lung: clear to ausculation bilaterally, respirations nonlabored, good inspiratory effort  Abdomen: soft, NTND. Normoactive BS  Extremities: no pedal edema or calf tenderness noted     LABS: Pending      I&O's Detail    16 Nov 2019 07:01  -  17 Nov 2019 07:00  --------------------------------------------------------  IN:    dextrose 5% + sodium chloride 0.45% with potassium chloride 20 mEq/L: 2420 mL  Total IN: 2420 mL    OUT:  Total OUT: 0 mL    Total NET: 2420 mL        Culture Results:   No growth (11-11 @ 06:57)  Culture Results:   No growth at 5 days. (11-11 @ 00:54)  Culture Results:   No growth at 5 days. (11-11 @ 00:54)      Impression: 54 y/o female admitted with gallstone pancreatitis, s/p normal HIDA and MRCP with no choledocholithiasis on prior admission last week.    Leukocytosis resolved, LFT improving, Afebrile, now up trending pancreatic enzymes.     Plan:  - f/u labs, continue to trend LFTs and pancreatic enzymes  - cont cld  - DVT prophylaxis, OOB, Ambulating, pain control  - lap keyla when pancreatitis resolves  - Will discuss with surgical attending

## 2019-11-18 LAB
ALBUMIN SERPL ELPH-MCNC: 2.7 G/DL — LOW (ref 3.3–5)
ALP SERPL-CCNC: 102 U/L — SIGNIFICANT CHANGE UP (ref 40–120)
ALT FLD-CCNC: 50 U/L — SIGNIFICANT CHANGE UP (ref 12–78)
ANION GAP SERPL CALC-SCNC: 7 MMOL/L — SIGNIFICANT CHANGE UP (ref 5–17)
AST SERPL-CCNC: 29 U/L — SIGNIFICANT CHANGE UP (ref 15–37)
BILIRUB SERPL-MCNC: 0.3 MG/DL — SIGNIFICANT CHANGE UP (ref 0.2–1.2)
BUN SERPL-MCNC: 4 MG/DL — LOW (ref 7–23)
CALCIUM SERPL-MCNC: 9.8 MG/DL — SIGNIFICANT CHANGE UP (ref 8.5–10.1)
CHLORIDE SERPL-SCNC: 110 MMOL/L — HIGH (ref 96–108)
CO2 SERPL-SCNC: 22 MMOL/L — SIGNIFICANT CHANGE UP (ref 22–31)
CREAT SERPL-MCNC: 0.61 MG/DL — SIGNIFICANT CHANGE UP (ref 0.5–1.3)
GLUCOSE SERPL-MCNC: 90 MG/DL — SIGNIFICANT CHANGE UP (ref 70–99)
HCT VFR BLD CALC: 36.3 % — SIGNIFICANT CHANGE UP (ref 34.5–45)
HGB BLD-MCNC: 12.1 G/DL — SIGNIFICANT CHANGE UP (ref 11.5–15.5)
LIDOCAIN IGE QN: 699 U/L — HIGH (ref 73–393)
MCHC RBC-ENTMCNC: 26.7 PG — LOW (ref 27–34)
MCHC RBC-ENTMCNC: 33.3 GM/DL — SIGNIFICANT CHANGE UP (ref 32–36)
MCV RBC AUTO: 80 FL — SIGNIFICANT CHANGE UP (ref 80–100)
NRBC # BLD: 0 /100 WBCS — SIGNIFICANT CHANGE UP (ref 0–0)
PLATELET # BLD AUTO: 313 K/UL — SIGNIFICANT CHANGE UP (ref 150–400)
POTASSIUM SERPL-MCNC: 3.9 MMOL/L — SIGNIFICANT CHANGE UP (ref 3.5–5.3)
POTASSIUM SERPL-SCNC: 3.9 MMOL/L — SIGNIFICANT CHANGE UP (ref 3.5–5.3)
PROT SERPL-MCNC: 7 GM/DL — SIGNIFICANT CHANGE UP (ref 6–8.3)
RBC # BLD: 4.54 M/UL — SIGNIFICANT CHANGE UP (ref 3.8–5.2)
RBC # FLD: 13.2 % — SIGNIFICANT CHANGE UP (ref 10.3–14.5)
S PNEUM AG SER QL: SIGNIFICANT CHANGE UP
SODIUM SERPL-SCNC: 139 MMOL/L — SIGNIFICANT CHANGE UP (ref 135–145)
WBC # BLD: 6.18 K/UL — SIGNIFICANT CHANGE UP (ref 3.8–10.5)
WBC # FLD AUTO: 6.18 K/UL — SIGNIFICANT CHANGE UP (ref 3.8–10.5)

## 2019-11-18 PROCEDURE — 99231 SBSQ HOSP IP/OBS SF/LOW 25: CPT

## 2019-11-18 PROCEDURE — 99232 SBSQ HOSP IP/OBS MODERATE 35: CPT

## 2019-11-18 RX ORDER — CHLORHEXIDINE GLUCONATE 213 G/1000ML
1 SOLUTION TOPICAL ONCE
Refills: 0 | Status: DISCONTINUED | OUTPATIENT
Start: 2019-11-18 | End: 2019-11-20

## 2019-11-18 RX ADMIN — DEXTROSE MONOHYDRATE, SODIUM CHLORIDE, AND POTASSIUM CHLORIDE 110 MILLILITER(S): 50; .745; 4.5 INJECTION, SOLUTION INTRAVENOUS at 17:17

## 2019-11-18 RX ADMIN — Medication 200 MILLIGRAM(S): at 21:26

## 2019-11-18 RX ADMIN — DEXTROSE MONOHYDRATE, SODIUM CHLORIDE, AND POTASSIUM CHLORIDE 110 MILLILITER(S): 50; .745; 4.5 INJECTION, SOLUTION INTRAVENOUS at 05:53

## 2019-11-18 RX ADMIN — Medication 200 MILLIGRAM(S): at 00:32

## 2019-11-18 RX ADMIN — HEPARIN SODIUM 5000 UNIT(S): 5000 INJECTION INTRAVENOUS; SUBCUTANEOUS at 05:27

## 2019-11-18 RX ADMIN — HEPARIN SODIUM 5000 UNIT(S): 5000 INJECTION INTRAVENOUS; SUBCUTANEOUS at 17:18

## 2019-11-18 NOTE — CHART NOTE - NSCHARTNOTEFT_GEN_A_CORE
Patient seen and examined at bedside for follow up.  Comfortable. Denies abdominal pain, nausea, vomiting.  Denies fever, chills, chest pain, sob.    AM labs reviewed:                        12.1   6.18  )-----------( 313      ( 18 Nov 2019 07:10 )             36.3   11-18    139  |  110<H>  |  4<L>  ----------------------------<  90  3.9   |  22  |  0.61    Ca    9.8      18 Nov 2019 07:10    TPro  7.0  /  Alb  2.7<L>  /  TBili  0.3  /  DBili  x   /  AST  29  /  ALT  50  /  AlkPhos  102  11-18    Lipase 699     Exam:  Gen: Alert, oriented, NAD  CV: S1S2 RRR  Lungs: CTA b/l, respirations nonlabored  Abd: Soft, NTND  Ext: No pedal edema b/l    Patient still with elevated lipase.  Will continue IV hydration. Monitor LFTs.  OR rescheduled tentatively for Wednesday.  Discussed with surgical attending.

## 2019-11-18 NOTE — DIETITIAN INITIAL EVALUATION ADULT. - PERTINENT LABORATORY DATA
11-18 Na139 mmol/L Glu 90 mg/dL K+ 3.9 mmol/L Cr  0.61 mg/dL BUN 4 mg/dL<L> 11-16 Phos 4.0 mg/dL 11-18 Alb 2.7 g/dL<L> 11-14 Chol --    LDL --    HDL --    Trig 65 mg/dL11-18 ALT 50 U/L AST 29 U/L Alkaline Phosphatase 102 U/L

## 2019-11-18 NOTE — PROGRESS NOTE ADULT - PROBLEM SELECTOR PLAN 1
awaiting surgery.  -Advanced diet as tolerated  -IV hydration   -w/u including TG, IGG4  -LFTs daily.

## 2019-11-18 NOTE — PROGRESS NOTE ADULT - ASSESSMENT
PT DATA ASSESSMENT RECOMMENDATION DETAILS         RO PNEUMONIA   A/R Basal consolidation or atelectasis on ct chest likely represent compressive atelectasis from pl effsns   If fever leukocytosis will Rx as aspiration pneumonia   11/16 pc was neg which indicates pt does not have bact pneumon    PULMONARY CLEARANCE   Pt is in optimal pulm shape and may goi for gb surgery if indicated     PLEURAL EFFSNS   Pl effsns likely secondary to pancreatic inflammation   If persist or increased  willget diagnostic thoraxc on larger l effsn     RESP GAS EXCHANGE   Monitor po Keep po betw 90 and 95%     GALL STONE PANCREATITIS   Cholecystectimy being planned       TIME SPENT Over 25 minutes aggregate care time spent on encounter; activities included   direct pt care, counseling and/or coordinating care reviewing notes, lab data/ imaging , discussion with multidisciplinary team/ pt /family. Risks, benefits, alternatives  discussed in detail.

## 2019-11-18 NOTE — CHART NOTE - NSCHARTNOTEFT_GEN_A_CORE
Upon Nutritional Assessment by the Registered Dietitian your patient was determined to meet criteria / has evidence of the following diagnosis/diagnoses:          [ ]  Mild Protein Calorie Malnutrition        [ ]  Moderate Protein Calorie Malnutrition        [x ] Severe Protein Calorie Malnutrition        [ ] Unspecified Protein Calorie Malnutrition        [ ] Underweight / BMI <19        [ ] Morbid Obesity / BMI > 40      Findings as based on:  •  Comprehensive nutrition assessment and consultation  •  Calorie counts (nutrient intake analysis)  •  Food acceptance and intake status from observations by staff  •  Follow up  •  Patient education  •  Intervention secondary to interdisciplinary rounds  •   concerns      Treatment:    The following diet has been recommended: Advance diet as medically feasible to Low fat as tolerated. If pt to be NPO/Clear > 7 days, and p.o. diet cannot be advanced, consider PPN as alternative nutriton support.      PROVIDER Section:     By signing this assessment you are acknowledging and agree with the diagnosis/diagnoses assigned by the Registered Dietitian    Comments:

## 2019-11-18 NOTE — PROGRESS NOTE ADULT - ASSESSMENT
ABDOMINAL PAIN with pancreatitis, + gallstones, dilated CBD 7 mm, mild wall thickening.     Recent admission for similar symptoms with elevated ast/alt 100-200, normal bili, MRCP negative for choledocholithiasis and HIDA negative for cholecystitis.     On current admission presents with abdominal pain with lipase 30k. Denies any new medications, ETOH abuse, trauma, infections.     MRCP repeated 11/13: noted cholelithiasis with pericholecystic fluid ?cholecystitis, acute pancreatitis, NO evidence of choleodocholithiasis, mild PD dilation 4 mm.     Appreciate surgery recs re: further management of cholecystitis. Surgery delayed given fever 100.4 yesterday.

## 2019-11-18 NOTE — PROGRESS NOTE ADULT - SUBJECTIVE AND OBJECTIVE BOX
Pre-operative Note    - Pre-operative Diagnosis: gallstone pancreatitis     - Procedure: laparoscopic cholecystectomy possible open     - Labs:  f/u am labs                         12.2   7.39  )-----------( 289      ( 17 Nov 2019 10:20 )             37.1     11-17    142  |  111<H>  |  4<L>  ----------------------------<  82  3.8   |  26  |  0.72    Ca    9.7      17 Nov 2019 10:20  Phos  4.0     11-16    TPro  7.7  /  Alb  3.0<L>  /  TBili  0.4  /  DBili  .10  /  AST  32  /  ALT  52  /  AlkPhos  107  11-17    PT/INR - ( 16 Nov 2019 06:55 )   PT: 14.1 sec;   INR: 1.25 ratio         Type & Screen #1:    - CXR: performed     - Blood: Not needed.     - Orders:  > NPO at midnight  > Perioperative antibiotics not needed.  > Morning Labs: CBC, BMP, coags, type & screen    - Permits:  > Consent in chart.  > Case scheduled with OR.

## 2019-11-18 NOTE — PROGRESS NOTE ADULT - SUBJECTIVE AND OBJECTIVE BOX
Patient seen and examined at bedside with no complaints.   Pt reports her abdominal pain has completely resolved.    Denies nausea/ vomiting, fever, chills, shortness of breath, chest pain.    Tolerating clear diet last night.  Now NPO for OR tomorrow.      Vital Signs Last 24 Hrs  T(F): 98.6 (11-17-19 @ 23:21), Max: 99.1 (11-17-19 @ 15:02)  HR: 70 (11-17-19 @ 23:21)  BP: 110/69 (11-17-19 @ 23:21)  RR: 17 (11-17-19 @ 23:21)  SpO2: 100% (11-17-19 @ 23:21)      GENERAL: Alert, NAD  CHEST/LUNG: Clear to auscultation bilaterally, respirations nonlabored  HEART: Regular rate and rhythm  ABDOMEN: +Bowel sounds, soft, Nontender, Nondistended, No guarding or rebound   EXTREMITIES:  no calf tenderness, No edema    I&O's Detail    16 Nov 2019 07:01  -  17 Nov 2019 07:00  --------------------------------------------------------  IN:    dextrose 5% + sodium chloride 0.45% with potassium chloride 20 mEq/L: 2420 mL  Total IN: 2420 mL    OUT:  Total OUT: 0 mL    Total NET: 2420 mL      17 Nov 2019 07:01  -  18 Nov 2019 02:46  --------------------------------------------------------  IN:    dextrose 5% + sodium chloride 0.45% with potassium chloride 20 mEq/L: 1100 mL    Oral Fluid: 360 mL  Total IN: 1460 mL    OUT:  Total OUT: 0 mL    Total NET: 1460 mL        LABS:                        12.2   7.39  )-----------( 289      ( 17 Nov 2019 10:20 )             37.1     11-17    142  |  111<H>  |  4<L>  ----------------------------<  82  3.8   |  26  |  0.72    Ca    9.7      17 Nov 2019 10:20  Phos  4.0     11-16    TPro  7.7  /  Alb  3.0<L>  /  TBili  0.4  /  DBili  .10  /  AST  32  /  ALT  52  /  AlkPhos  107  11-17    PT/INR - ( 16 Nov 2019 06:55 )   PT: 14.1 sec;   INR: 1.25 ratio         Impression: 54 y/o female admitted with gallstone pancreatitis, s/p normal HIDA and MRCP with no choledocholithiasis on prior admission last week.    Leukocytosis resolved, LFT improving, Afebrile, now up trending pancreatic enzymes.     Plan:  - srinivas keyla booked as add-on for Monday 11/18/19  - f/u morning labs, continue to trend LFTs and pancreatic enzymes  - NPO, IVF  - pain management, anti-emetics  - DVT prophylaxis, OOB, Ambulating, pain control  - Will discuss with surgical attending

## 2019-11-18 NOTE — PROGRESS NOTE ADULT - SUBJECTIVE AND OBJECTIVE BOX
REVIEW OF SYMPTOMS      Able to give ROS  Yes     RELIABLE No   CONSTITUTIONAL Weakness Yes  Chills No Vision changes No  ENDOCRINE No unexplained hair loss No heat or cold intolerance    ALLERGY No hives  Sore throat No   RESP Coughing blood no  Shortness of breath YES   NEURO No Headache  Confusion Pain neck No   CARDIAC No Chest pain No Palpitations   GI No Pain abdomen NO   Vomiting NO     PHYSICAL EXAM    HEENT Unremarkable PERRLA atraumatic   RESP Fair air entry EXP prolonged    Harsh breath sound Resp distres mild   CARDIAC S1 S2 No S3     NO JVD    ABDOMEN SOFT BS PRESENT NOT DISTENDED No hepatosplenomegaly PEDAL EDEMA present No calf tenderness  NO rash   GENERAL Not TOXIC looking    VITALS/LABS       2019 99f 70 100/51 18 99%   2019 W 6.1 Hb 12.1 Plt 313 Na 139 K 3.9 CO2 22 Cr .6     PATIENT CHAMBERS BOBBY   1965 DOA 11/10/2019 [facility]  [address] FREDDY BARKLEY                            PATIENT DATA   ALLERGY nka  ADVANCED DIRECTIVE       WT   76            BMI  28                                                                                                                                          HEAD OF BED ELEVATION Yes          DVT PROPHYLAXIS   hpsc (11/10)        STRESS ULCER PROPHYLAXIS  INFECTION PPLX                                                        DYSPHAGIA EVAL                                           DIET clear liq ()   IV D51/2 NS 20K 110 ()   GAS EXCHANGE 11/15/2019 ra 100%                                                                            CXR   2019 cxr sm bl effs                             CT   ctap ic 1) Mild bl effsn 2) atelect and consolidation bl lower lobes 3) Mild pancreatitis 4) mild ascites   Hb 11/ Hb 11.4 -11              W 11/ W 9.5-7.1  PC 2019 pc n   Amylase LIPASE ----11/15 ---- Amylase  [id number]  Lipase   [id number]           MICROBIO    urine culture n   sterp n  leg n   blod culture n   mrsa n  leg n                             PT DESCRIPTION     53 f admitted with pain abd No signi PMH   11/10 US showed mild wall thickening suggesting choldecystitis and labs showed pancreatitis Pt was managed conservatively for gallstone pancreatitis   Pulm consulted 11/15/2019 because of small bl effsns and consolidation lower lobes on ctap

## 2019-11-18 NOTE — DIETITIAN INITIAL EVALUATION ADULT. - OTHER INFO
Pt w/ gallstone pancreatitis; s/p HIDA & MRCP ; improving LFT's. Plan is for Lap Juliane when pancreatitis resolves. PTA pt was living w/ her son, following a Regular diet. No hx of wt loss. Denies N/V/D/C/food allergies or abd pain. Was tolerating a clear liquid diet consuming 75 - 100% meals prior to NPO.  Pt appears WDWN.  Reviewed and provided pancreatitis nutrition therapy

## 2019-11-18 NOTE — PROGRESS NOTE ADULT - SUBJECTIVE AND OBJECTIVE BOX
Gastroenterology  Patient seen and examined bedside resting comfortably.  No complaints offered.       T(F): 98.1 (11-18-19 @ 05:00), Max: 99.1 (11-17-19 @ 15:02)  HR: 73 (11-18-19 @ 05:00) (69 - 81)  BP: 102/68 (11-18-19 @ 05:00) (94/58 - 110/69)  RR: 17 (11-18-19 @ 05:00) (17 - 18)  SpO2: 100% (11-18-19 @ 05:00) (98% - 100%)  Wt(kg): --  CAPILLARY BLOOD GLUCOSE          PHYSICAL EXAM:  General: NAD, WDWN.   Neuro:  Alert & responsive  HEENT: NCAT, EOMI, conjunctiva clear  CV: +S1+S2 regular rate and rhythm  Lung: clear to ausculation bilaterally, respirations nonlabored, good inspiratory effort  Abdomen: soft, Non Tender, No distention Normal active BS  Extremities: no cyanosis, clubbing or edema    LABS:                        12.1   6.18  )-----------( 313      ( 18 Nov 2019 07:10 )             36.3     11-18    139  |  110<H>  |  4<L>  ----------------------------<  90  3.9   |  22  |  0.61    Ca    9.8      18 Nov 2019 07:10    TPro  7.0  /  Alb  2.7<L>  /  TBili  0.3  /  DBili  x   /  AST  29  /  ALT  50  /  AlkPhos  102  11-18    LIVER FUNCTIONS - ( 18 Nov 2019 07:10 )  Alb: 2.7 g/dL / Pro: 7.0 gm/dL / ALK PHOS: 102 U/L / ALT: 50 U/L / AST: 29 U/L / GGT: x           Amylase, Serum Total (11.17.19 @ 10:20)    Amylase, Serum Total: 145 U/L    Lipase, Serum in AM (11.18.19 @ 07:10)    Lipase, Serum: 699 U/L      I&O's Detail    17 Nov 2019 07:01  -  18 Nov 2019 07:00  --------------------------------------------------------  IN:    dextrose 5% + sodium chloride 0.45% with potassium chloride 20 mEq/L: 1100 mL    Oral Fluid: 360 mL  Total IN: 1460 mL    OUT:  Total OUT: 0 mL    Total NET: 1460 mL        11-18 @ 07:10    139 | 110 | 4  /9.8 | -- | --  _______________________/  3.9 | 22 | 0.61                           \par   Lipase, Serum: 699 U/L (11-18 @ 07:10)

## 2019-11-18 NOTE — DIETITIAN INITIAL EVALUATION ADULT. - ALTERNATE MEANS OF NUTRITION
If diet cannot be advanced, NPO/Clear > 7 days -> consider alternate nutrition support be considered via PPN/PPN

## 2019-11-18 NOTE — DIETITIAN INITIAL EVALUATION ADULT. - PHYSICAL APPEARANCE
other (specify)/well nourished/BMI = 27.9 ; no edema noted Nutrition focused physical exam conducted ;   Subcutaneous fat loss: [wdl ] Orbital fat pads region, [wdl ]Buccal fat region, [wdl ]Triceps region,  [wdl ]Ribs region.  Muscle wasting: [wdl ]Temples region, [ wdl]Clavicle region, [wdl ]Shoulder region, [ wdl]Scapula region, [wdl ]Interosseous region,  [wdl ]thigh region, [wdl ]Calf region

## 2019-11-19 ENCOUNTER — TRANSCRIPTION ENCOUNTER (OUTPATIENT)
Age: 54
End: 2019-11-19

## 2019-11-19 LAB
ALBUMIN SERPL ELPH-MCNC: 3 G/DL — LOW (ref 3.3–5)
ALP SERPL-CCNC: 106 U/L — SIGNIFICANT CHANGE UP (ref 40–120)
ALT FLD-CCNC: 51 U/L — SIGNIFICANT CHANGE UP (ref 12–78)
ANION GAP SERPL CALC-SCNC: 4 MMOL/L — LOW (ref 5–17)
AST SERPL-CCNC: 26 U/L — SIGNIFICANT CHANGE UP (ref 15–37)
BILIRUB DIRECT SERPL-MCNC: 0.18 MG/DL — SIGNIFICANT CHANGE UP (ref 0.05–0.2)
BILIRUB INDIRECT FLD-MCNC: 0.1 MG/DL — LOW (ref 0.2–1)
BILIRUB SERPL-MCNC: 0.3 MG/DL — SIGNIFICANT CHANGE UP (ref 0.2–1.2)
BLD GP AB SCN SERPL QL: SIGNIFICANT CHANGE UP
BUN SERPL-MCNC: 6 MG/DL — LOW (ref 7–23)
CALCIUM SERPL-MCNC: 9.9 MG/DL — SIGNIFICANT CHANGE UP (ref 8.5–10.1)
CHLORIDE SERPL-SCNC: 109 MMOL/L — HIGH (ref 96–108)
CO2 SERPL-SCNC: 25 MMOL/L — SIGNIFICANT CHANGE UP (ref 22–31)
CREAT SERPL-MCNC: 0.65 MG/DL — SIGNIFICANT CHANGE UP (ref 0.5–1.3)
GLUCOSE SERPL-MCNC: 94 MG/DL — SIGNIFICANT CHANGE UP (ref 70–99)
HCT VFR BLD CALC: 36.2 % — SIGNIFICANT CHANGE UP (ref 34.5–45)
HGB BLD-MCNC: 12.1 G/DL — SIGNIFICANT CHANGE UP (ref 11.5–15.5)
LIDOCAIN IGE QN: 828 U/L — HIGH (ref 73–393)
MCHC RBC-ENTMCNC: 26.4 PG — LOW (ref 27–34)
MCHC RBC-ENTMCNC: 33.4 GM/DL — SIGNIFICANT CHANGE UP (ref 32–36)
MCV RBC AUTO: 78.9 FL — LOW (ref 80–100)
NRBC # BLD: 0 /100 WBCS — SIGNIFICANT CHANGE UP (ref 0–0)
PLATELET # BLD AUTO: 350 K/UL — SIGNIFICANT CHANGE UP (ref 150–400)
POTASSIUM SERPL-MCNC: 3.6 MMOL/L — SIGNIFICANT CHANGE UP (ref 3.5–5.3)
POTASSIUM SERPL-SCNC: 3.6 MMOL/L — SIGNIFICANT CHANGE UP (ref 3.5–5.3)
PROT SERPL-MCNC: 7.3 GM/DL — SIGNIFICANT CHANGE UP (ref 6–8.3)
RBC # BLD: 4.59 M/UL — SIGNIFICANT CHANGE UP (ref 3.8–5.2)
RBC # FLD: 13.2 % — SIGNIFICANT CHANGE UP (ref 10.3–14.5)
SODIUM SERPL-SCNC: 138 MMOL/L — SIGNIFICANT CHANGE UP (ref 135–145)
WBC # BLD: 6.21 K/UL — SIGNIFICANT CHANGE UP (ref 3.8–10.5)
WBC # FLD AUTO: 6.21 K/UL — SIGNIFICANT CHANGE UP (ref 3.8–10.5)

## 2019-11-19 PROCEDURE — 99231 SBSQ HOSP IP/OBS SF/LOW 25: CPT

## 2019-11-19 PROCEDURE — 99232 SBSQ HOSP IP/OBS MODERATE 35: CPT

## 2019-11-19 RX ORDER — SODIUM CHLORIDE 9 MG/ML
1000 INJECTION INTRAMUSCULAR; INTRAVENOUS; SUBCUTANEOUS ONCE
Refills: 0 | Status: COMPLETED | OUTPATIENT
Start: 2019-11-19 | End: 2019-11-19

## 2019-11-19 RX ADMIN — Medication 650 MILLIGRAM(S): at 16:32

## 2019-11-19 RX ADMIN — SODIUM CHLORIDE 1000 MILLILITER(S): 9 INJECTION INTRAMUSCULAR; INTRAVENOUS; SUBCUTANEOUS at 09:18

## 2019-11-19 RX ADMIN — Medication 650 MILLIGRAM(S): at 17:30

## 2019-11-19 RX ADMIN — HEPARIN SODIUM 5000 UNIT(S): 5000 INJECTION INTRAVENOUS; SUBCUTANEOUS at 16:34

## 2019-11-19 RX ADMIN — HEPARIN SODIUM 5000 UNIT(S): 5000 INJECTION INTRAVENOUS; SUBCUTANEOUS at 06:32

## 2019-11-19 NOTE — PROGRESS NOTE ADULT - ASSESSMENT
PT DATA ASSESSMENT RECOMMENDATION DETAILS         PNEUMONIA RULED OUT  A/R Basal consolidation or atelectasis on ct chest likely represent compressive atelectasis from pl effsns   If fever leukocytosis will Rx as aspiration pneumonia   11/16 pc was neg which indicates pt does not have bact pneumon      PLEURAL EFFSNS   Pl effsns likely secondary to pancreatic inflammation   If persist or increased  willget diagnostic thoraxc on larger l effsn     PULMONARY CLEARANCE   Pt is in optimal pulm shape and may goi for gb surgery if indicated     RESP GAS EXCHANGE   Monitor po Keep po betw 90 and 95%     GALL STONE PANCREATITIS   Cholecystectimy being planned     TIME SPENT Over 25 minutes aggregate care time spent on encounter; activities included   direct pt care, counseling and/or coordinating care reviewing notes, lab data/ imaging , discussion with multidisciplinary team/ pt /family. Risks, benefits, alternatives  discussed in detail.

## 2019-11-19 NOTE — PROGRESS NOTE ADULT - PROBLEM SELECTOR PLAN 1
awaiting surgery.  -Advanced diet as tolerated  -IV hydration   -w/u including TG, IGG4  -LFTs daily. Parent

## 2019-11-19 NOTE — PROGRESS NOTE ADULT - SUBJECTIVE AND OBJECTIVE BOX
VITALS/LABS       2019 afeb 77 103/60 17 98%   2019 W 6.2 Hb 12.1 Plt 350 Na 138 K 3.6 CO2 25 Cr .6     REVIEW OF SYMPTOMS      Able to give ROS  Yes     RELIABLE No   CONSTITUTIONAL Weakness Yes  Chills No Vision changes No  ENDOCRINE No unexplained hair loss No heat or cold intolerance    ALLERGY No hives  Sore throat No   RESP Coughing blood no  Shortness of breath YES   NEURO No Headache  Confusion Pain neck No   CARDIAC No Chest pain No Palpitations   GI No Pain abdomen NO   Vomiting NO     PHYSICAL EXAM    HEENT Unremarkable PERRLA atraumatic   RESP Fair air entry EXP prolonged    Harsh breath sound Resp distres mild   CARDIAC S1 S2 No S3     NO JVD    ABDOMEN SOFT BS PRESENT NOT DISTENDED No hepatosplenomegaly PEDAL EDEMA present No calf tenderness  NO rash   GENERAL Not TOXIC looking    PATIENT CHAMBERS BOBBY   1965 DOA 11/10/2019 [facility]  [address] FREDDY BARKLEY                            PATIENT DATA   ALLERGY nka  ADVANCED DIRECTIVE       WT   76            BMI  28                                                                                                                                          HEAD OF BED ELEVATION Yes          DVT PROPHYLAXIS   hpsc (11/10)        STRESS ULCER PROPHYLAXIS  INFECTION PPLX                                                        DYSPHAGIA EVAL                                           DIET clear liq ()   IV D51/2 NS 20K 110 ()                                                        CXR   2019 cxr sm bl effs                             CT   ctap ic 1) Mild bl effsn 2) atelect and consolidation bl lower lobes 3) Mild pancreatitis 4) mild ascites   Hb 11/ Hb 11.4 -11              W 11/ W 9.5-7.1  PC 2019 pc n   Amylase LIPASE ----11/15 ----- Amylase  [id number]  Lipase   [id number]           MICROBIO    urine culture n   sterp n  leg n   blod culture n   mrsa n  leg n                             PT DESCRIPTION     53 f admitted with pain abd No signi PMH   11/10 US showed mild wall thickening suggesting choldecystitis and labs showed pancreatitis Pt was managed conservatively for gallstone pancreatitis   Pulm consulted 11/15/2019 because of small bl effsns and consolidation lower lobes on ctap

## 2019-11-19 NOTE — PROGRESS NOTE ADULT - SUBJECTIVE AND OBJECTIVE BOX
Patient seen and examined at bedside with no complaints.   Pt reports abdominal pain has resolved.  Admits to flatus/BM this AM.   Denies nausea/ vomiting, fever, chills, shortness of breath, chest pain.        Vital Signs Last 24 Hrs  T(F): 98.5 (11-19-19 @ 05:00), Max: 99.1 (11-18-19 @ 17:15)  HR: 77 (11-19-19 @ 05:00)  BP: 105/60 (11-19-19 @ 05:00)  RR: 17 (11-19-19 @ 05:00)  SpO2: 99% (11-19-19 @ 05:00)        GENERAL: Alert, NAD  CHEST/LUNG: Clear to auscultation bilaterally, respirations nonlabored  HEART: S1S2, Regular rate and rhythm  ABDOMEN: +Bowel sounds, soft, Nontender, Nondistended, no guarding, no rebound   EXTREMITIES:  no calf tenderness, No edema    I&O's Detail    17 Nov 2019 07:01  -  18 Nov 2019 07:00  --------------------------------------------------------  IN:    dextrose 5% + sodium chloride 0.45% with potassium chloride 20 mEq/L: 1100 mL    Oral Fluid: 360 mL  Total IN: 1460 mL    OUT:  Total OUT: 0 mL    Total NET: 1460 mL          LABS:                        12.1   6.18  )-----------( 313      ( 18 Nov 2019 07:10 )             36.3     11-18    139  |  110<H>  |  4<L>  ----------------------------<  90  3.9   |  22  |  0.61    Ca    9.8      18 Nov 2019 07:10    TPro  7.0  /  Alb  2.7<L>  /  TBili  0.3  /  DBili  x   /  AST  29  /  ALT  50  /  AlkPhos  102  11-18      Impression: 52 y/o female admitted with gallstone pancreatitis, s/p normal HIDA and MRCP with no choledocholithiasis on prior admission last week.    Leukocytosis resolved, LFTs and pancreatic enzymes downtrending, Afebrile     Plan:  - srinivas keyla booked tentatively for Wednesday   - f/u morning labs, continue to trend LFTs and pancreatic enzymes  - NPO, IVF  - pain management, anti-emetics  - DVT prophylaxis, OOB, Ambulating, pain control  - Will discuss with surgical attending

## 2019-11-19 NOTE — PROGRESS NOTE ADULT - SUBJECTIVE AND OBJECTIVE BOX
Gastroenterology  Patient seen and examined bedside resting comfortably.  No complaints offered.     T(F): 98.5 (11-19-19 @ 11:29), Max: 99.1 (11-18-19 @ 17:15)  HR: 77 (11-19-19 @ 11:29) (68 - 77)  BP: 103/60 (11-19-19 @ 11:29) (100/51 - 105/68)  RR: 17 (11-19-19 @ 11:29) (17 - 18)  SpO2: 98% (11-19-19 @ 11:29) (98% - 99%)  Wt(kg): --  CAPILLARY BLOOD GLUCOSE          PHYSICAL EXAM:  General: NAD, WDWN.   Neuro:  Alert & responsive  HEENT: NCAT, EOMI, conjunctiva clear  CV: +S1+S2 regular rate and rhythm  Lung: clear to ausculation bilaterally, respirations nonlabored, good inspiratory effort  Abdomen: soft, Non Tender, No distention Normal active BS  Extremities: no cyanosis, clubbing or edema    LABS:                        12.1   6.21  )-----------( 350      ( 19 Nov 2019 08:18 )             36.2     11-19    138  |  109<H>  |  6<L>  ----------------------------<  94  3.6   |  25  |  0.65    Ca    9.9      19 Nov 2019 08:18    TPro  7.3  /  Alb  3.0<L>  /  TBili  0.3  /  DBili  .18  /  AST  26  /  ALT  51  /  AlkPhos  106  11-19    LIVER FUNCTIONS - ( 19 Nov 2019 08:18 )  Alb: 3.0 g/dL / Pro: 7.3 gm/dL / ALK PHOS: 106 U/L / ALT: 51 U/L / AST: 26 U/L / GGT: x             I&O's Detail    11-19 @ 08:18    138 | 109 | 6  /9.9 | -- | --  _______________________/  3.6 | 25 | 0.65                           \par   Lipase, Serum: 828 U/L (11-19 @ 08:18)

## 2019-11-20 ENCOUNTER — RESULT REVIEW (OUTPATIENT)
Age: 54
End: 2019-11-20

## 2019-11-20 LAB
ALBUMIN SERPL ELPH-MCNC: 2.9 G/DL — LOW (ref 3.3–5)
ALP SERPL-CCNC: 100 U/L — SIGNIFICANT CHANGE UP (ref 40–120)
ALT FLD-CCNC: 55 U/L — SIGNIFICANT CHANGE UP (ref 12–78)
ANION GAP SERPL CALC-SCNC: 7 MMOL/L — SIGNIFICANT CHANGE UP (ref 5–17)
APTT BLD: 26.7 SEC — LOW (ref 28.5–37)
AST SERPL-CCNC: 31 U/L — SIGNIFICANT CHANGE UP (ref 15–37)
BILIRUB SERPL-MCNC: 0.3 MG/DL — SIGNIFICANT CHANGE UP (ref 0.2–1.2)
BUN SERPL-MCNC: 5 MG/DL — LOW (ref 7–23)
CALCIUM SERPL-MCNC: 9.7 MG/DL — SIGNIFICANT CHANGE UP (ref 8.5–10.1)
CHLORIDE SERPL-SCNC: 110 MMOL/L — HIGH (ref 96–108)
CO2 SERPL-SCNC: 24 MMOL/L — SIGNIFICANT CHANGE UP (ref 22–31)
CREAT SERPL-MCNC: 0.7 MG/DL — SIGNIFICANT CHANGE UP (ref 0.5–1.3)
GLUCOSE SERPL-MCNC: 85 MG/DL — SIGNIFICANT CHANGE UP (ref 70–99)
HCT VFR BLD CALC: 36.9 % — SIGNIFICANT CHANGE UP (ref 34.5–45)
HGB BLD-MCNC: 12.2 G/DL — SIGNIFICANT CHANGE UP (ref 11.5–15.5)
INR BLD: 1.34 RATIO — HIGH (ref 0.88–1.16)
MCHC RBC-ENTMCNC: 26.3 PG — LOW (ref 27–34)
MCHC RBC-ENTMCNC: 33.1 GM/DL — SIGNIFICANT CHANGE UP (ref 32–36)
MCV RBC AUTO: 79.7 FL — LOW (ref 80–100)
NRBC # BLD: 0 /100 WBCS — SIGNIFICANT CHANGE UP (ref 0–0)
PLATELET # BLD AUTO: 403 K/UL — HIGH (ref 150–400)
POTASSIUM SERPL-MCNC: 3.9 MMOL/L — SIGNIFICANT CHANGE UP (ref 3.5–5.3)
POTASSIUM SERPL-SCNC: 3.9 MMOL/L — SIGNIFICANT CHANGE UP (ref 3.5–5.3)
PROT SERPL-MCNC: 7.3 GM/DL — SIGNIFICANT CHANGE UP (ref 6–8.3)
PROTHROM AB SERPL-ACNC: 15.2 SEC — HIGH (ref 10–12.9)
RBC # BLD: 4.63 M/UL — SIGNIFICANT CHANGE UP (ref 3.8–5.2)
RBC # FLD: 13.2 % — SIGNIFICANT CHANGE UP (ref 10.3–14.5)
SODIUM SERPL-SCNC: 141 MMOL/L — SIGNIFICANT CHANGE UP (ref 135–145)
WBC # BLD: 5.4 K/UL — SIGNIFICANT CHANGE UP (ref 3.8–10.5)
WBC # FLD AUTO: 5.4 K/UL — SIGNIFICANT CHANGE UP (ref 3.8–10.5)

## 2019-11-20 PROCEDURE — 99231 SBSQ HOSP IP/OBS SF/LOW 25: CPT | Mod: 57

## 2019-11-20 PROCEDURE — 99232 SBSQ HOSP IP/OBS MODERATE 35: CPT

## 2019-11-20 PROCEDURE — 88304 TISSUE EXAM BY PATHOLOGIST: CPT | Mod: 26

## 2019-11-20 PROCEDURE — 47562 LAPAROSCOPIC CHOLECYSTECTOMY: CPT | Mod: AS

## 2019-11-20 PROCEDURE — 47562 LAPAROSCOPIC CHOLECYSTECTOMY: CPT

## 2019-11-20 RX ORDER — MORPHINE SULFATE 50 MG/1
2 CAPSULE, EXTENDED RELEASE ORAL EVERY 4 HOURS
Refills: 0 | Status: DISCONTINUED | OUTPATIENT
Start: 2019-11-20 | End: 2019-11-20

## 2019-11-20 RX ORDER — SODIUM CHLORIDE 9 MG/ML
1000 INJECTION, SOLUTION INTRAVENOUS
Refills: 0 | Status: DISCONTINUED | OUTPATIENT
Start: 2019-11-20 | End: 2019-11-20

## 2019-11-20 RX ORDER — DEXTROSE MONOHYDRATE, SODIUM CHLORIDE, AND POTASSIUM CHLORIDE 50; .745; 4.5 G/1000ML; G/1000ML; G/1000ML
1000 INJECTION, SOLUTION INTRAVENOUS
Refills: 0 | Status: DISCONTINUED | OUTPATIENT
Start: 2019-11-20 | End: 2019-11-20

## 2019-11-20 RX ORDER — PIPERACILLIN AND TAZOBACTAM 4; .5 G/20ML; G/20ML
3.38 INJECTION, POWDER, LYOPHILIZED, FOR SOLUTION INTRAVENOUS EVERY 8 HOURS
Refills: 0 | Status: DISCONTINUED | OUTPATIENT
Start: 2019-11-20 | End: 2019-11-20

## 2019-11-20 RX ORDER — ACETAMINOPHEN 500 MG
650 TABLET ORAL EVERY 6 HOURS
Refills: 0 | Status: DISCONTINUED | OUTPATIENT
Start: 2019-11-20 | End: 2019-11-21

## 2019-11-20 RX ORDER — OXYCODONE AND ACETAMINOPHEN 5; 325 MG/1; MG/1
2 TABLET ORAL EVERY 4 HOURS
Refills: 0 | Status: DISCONTINUED | OUTPATIENT
Start: 2019-11-20 | End: 2019-11-21

## 2019-11-20 RX ORDER — LANOLIN ALCOHOL/MO/W.PET/CERES
3 CREAM (GRAM) TOPICAL AT BEDTIME
Refills: 0 | Status: DISCONTINUED | OUTPATIENT
Start: 2019-11-20 | End: 2019-11-21

## 2019-11-20 RX ORDER — HEPARIN SODIUM 5000 [USP'U]/ML
5000 INJECTION INTRAVENOUS; SUBCUTANEOUS EVERY 12 HOURS
Refills: 0 | Status: DISCONTINUED | OUTPATIENT
Start: 2019-11-20 | End: 2019-11-21

## 2019-11-20 RX ORDER — HYDROMORPHONE HYDROCHLORIDE 2 MG/ML
0.5 INJECTION INTRAMUSCULAR; INTRAVENOUS; SUBCUTANEOUS
Refills: 0 | Status: DISCONTINUED | OUTPATIENT
Start: 2019-11-20 | End: 2019-11-20

## 2019-11-20 RX ORDER — ONDANSETRON 8 MG/1
4 TABLET, FILM COATED ORAL EVERY 6 HOURS
Refills: 0 | Status: DISCONTINUED | OUTPATIENT
Start: 2019-11-20 | End: 2019-11-21

## 2019-11-20 RX ADMIN — Medication 650 MILLIGRAM(S): at 16:25

## 2019-11-20 RX ADMIN — SODIUM CHLORIDE 50 MILLILITER(S): 9 INJECTION, SOLUTION INTRAVENOUS at 10:14

## 2019-11-20 RX ADMIN — ONDANSETRON 4 MILLIGRAM(S): 8 TABLET, FILM COATED ORAL at 11:35

## 2019-11-20 RX ADMIN — SODIUM CHLORIDE 110 MILLILITER(S): 9 INJECTION, SOLUTION INTRAVENOUS at 12:46

## 2019-11-20 RX ADMIN — DEXTROSE MONOHYDRATE, SODIUM CHLORIDE, AND POTASSIUM CHLORIDE 110 MILLILITER(S): 50; .745; 4.5 INJECTION, SOLUTION INTRAVENOUS at 06:52

## 2019-11-20 RX ADMIN — HEPARIN SODIUM 5000 UNIT(S): 5000 INJECTION INTRAVENOUS; SUBCUTANEOUS at 18:05

## 2019-11-20 RX ADMIN — HEPARIN SODIUM 5000 UNIT(S): 5000 INJECTION INTRAVENOUS; SUBCUTANEOUS at 06:51

## 2019-11-20 RX ADMIN — Medication 650 MILLIGRAM(S): at 17:20

## 2019-11-20 NOTE — PROGRESS NOTE ADULT - ATTENDING COMMENTS
Ms. Hill was examined. Full note to follow. Overall feeling well this morning denies abdominal pain despite elevation in lipase. Given she is non tender on examination and continues to has signs and symptoms of gallstone pancreatitis. I have discussed the options with the patient and reviewed both non-operative and operative treatment methods.  I have reviewed the risks and benefits of both approaches, and have discussed the possible complications associated with the surgical procedure.  She is aware that there is a risk of infection bleeding, injury to surrounding structures and that there may be the need for additional surgery in the future.  I have recommended that we proceed with laparoscopic cholecystectomy.  She has given consent to proceed with the procedure.
Ms. Hill was examined. Overall feeling well. Abdominal pain resolved. No nausea or vomiting. Abdominal examination benign. Plan for OR Wednesday for laparoscopic cholecystectomy
Ms. Hill was examined. Overall her abdominal pain has resolved. She has remained afebrile for 24 hours and has been tolerating a diet. On examination she is soft, nontender, nondistended. Given the presentation of gallstone pancreatitis and currently asymptomatic, will plan for laparoscopic cholecystectomy prior to discharge to prevent recurrence.
Ms. Hill was examined. Overall her pain has resolved. Her abdominal examination is benign. Plan for Laparoscopic cholecystectomy in AM.
Ms. Hill was examined. Planned for OR 11/20/.
Will plan for possible laparoscopic cholecystectomy, however if patient spikes a fever, or has other evidence of ongoing and active pancreatitis, will consider holding off surgery until resolution of pancreatitis is confirmed clinically. I relayed this to the patient who stated she understood and was agreeable to this plan.

## 2019-11-20 NOTE — PROGRESS NOTE ADULT - SUBJECTIVE AND OBJECTIVE BOX
REVIEW OF SYMPTOMS      Able to give ROS  Yes     RELIABLE No   CONSTITUTIONAL Weakness Yes  Chills No Vision changes No  ENDOCRINE No unexplained hair loss No heat or cold intolerance    ALLERGY No hives  Sore throat No   RESP Coughing blood no  Shortness of breath YES   NEURO No Headache  Confusion Pain neck No   CARDIAC No Chest pain No Palpitations   GI No Pain abdomen NO   Vomiting NO     PHYSICAL EXAM    HEENT Unremarkable PERRLA atraumatic   RESP Fair air entry EXP prolonged    Harsh breath sound Resp distres mild   CARDIAC S1 S2 No S3     NO JVD    ABDOMEN SOFT BS PRESENT NOT DISTENDED No hepatosplenomegaly PEDAL EDEMA present No calf tenderness  NO rash   GENERAL Not TOXIC looking    VITALS/LABS    2019 afeb 87 109/70 18   95%   2019 W 5.4 Hb 12.2 Plt 403  Na 141 K 3.9 CO2 24 Cr .7      PATIENT CHAMBERS BOBBY   1965 DOA 11/10/2019 [facility]  [address] FREDDY BARKLEY                            PATIENT DATA   ALLERGY nka  ADVANCED DIRECTIVE       WT   76            BMI  28   PROCEDURE 2019 lap keyla                                                                                                                                          HEAD OF BED ELEVATION Yes          DVT PROPHYLAXIS   hpsc (11/10)        STRESS ULCER PROPHYLAXIS  INFECTION PPLX                                                        DYSPHAGIA EVAL                                           DIET clear liq ()   IV D51/2 NS 20K 110 ()   GAS EXCHANGE 11/15/2019 ra 100%                                                                                       CXR   2019 cxr sm bl effs                             CT   ctap ic 1) Mild bl effsn 2) atelect and consolidation bl lower lobes 3) Mild pancreatitis 4) mild ascites   Hb 11/ Hb 11.4 -11              W 11/ W 9.5-7.1  PC 2019 pc n   Amylase LIPASE ----11/15 ----- Amylase  [id number]  Lipase   [id number]           MICROBIO    urine culture n   sterp n  leg n   blod culture n   mrsa n  leg n                             PT DESCRIPTION     53 f admitted with pain abd No signi PMH   11/10 US showed mild wall thickening suggesting choldecystitis and labs showed pancreatitis Pt was managed conservatively for gallstone pancreatitis   Pulm consulted 11/15/2019 because of small bl effsns and consolidation lower lobes on ctap

## 2019-11-20 NOTE — PROGRESS NOTE ADULT - SUBJECTIVE AND OBJECTIVE BOX
POST OP CHECK NOTE    Interval History: POD 0 s/p Lap cholecystectomy. Patient seen and examined at bedside. Admits to mild nausea that improved with antiemetics. Denies any major abdominal pain/vomiting. Tolerating clear liquids. has not been OOBTC yet. IS at bedside     Vital Signs Last 24 Hrs  T(F): 98.3 (11-20-19 @ 13:30), Max: 98.9 (11-20-19 @ 06:30)  HR: 64 (11-20-19 @ 13:30)  BP: 109/63 (11-20-19 @ 13:30)  RR: 17 (11-20-19 @ 13:30)  SpO2: 96% (11-20-19 @ 13:30)    PHYSICAL EXAM:  GENERAL: Alert, NAD  CHEST/LUNG: respirations nonlabored  HEART: Regular rate and rhythm  ABDOMEN: Port site dressings c/d/i. Abdomen is otherwise soft, nondistended with appropriate incisional tenderness   EXTREMITIES:  no calf tenderness, No edema    I&O's Detail  19 Nov 2019 07:01  -  20 Nov 2019 07:00  IN:    dextrose 5% + sodium chloride 0.45% with potassium chloride 20 mEq/L: 1100 mL    Oral Fluid: 340 mL  Total IN: 1440 mL  OUT:  Total OUT: 0 mL  Total NET: 1440 mL    LABS:                        12.2   5.40  )-----------( 403      ( 20 Nov 2019 06:41 )             36.9     141  |  110<H>  |  5<L>  ----------------------------<  85  3.9   |  24  |  0.70      ASSESSMENT & PLAN:  53F POD 0 s/p Lap Cholecystectomy for acute gallstone pancreatitis.  - Regular, low fat diet.  - f/u AM labs tomorrow  - DVT prophylaxis, Incentive Spirometer, OOB, Ambulating, pain/nausea management  - Plan for discharge in the AM if stable.

## 2019-11-21 ENCOUNTER — TRANSCRIPTION ENCOUNTER (OUTPATIENT)
Age: 54
End: 2019-11-21

## 2019-11-21 VITALS
TEMPERATURE: 98 F | DIASTOLIC BLOOD PRESSURE: 64 MMHG | RESPIRATION RATE: 16 BRPM | SYSTOLIC BLOOD PRESSURE: 112 MMHG | OXYGEN SATURATION: 98 % | WEIGHT: 157.19 LBS | HEART RATE: 66 BPM

## 2019-11-21 LAB
ALBUMIN SERPL ELPH-MCNC: 2.9 G/DL — LOW (ref 3.3–5)
ALP SERPL-CCNC: 108 U/L — SIGNIFICANT CHANGE UP (ref 40–120)
ALT FLD-CCNC: 86 U/L — HIGH (ref 12–78)
ANION GAP SERPL CALC-SCNC: 6 MMOL/L — SIGNIFICANT CHANGE UP (ref 5–17)
AST SERPL-CCNC: 55 U/L — HIGH (ref 15–37)
BILIRUB SERPL-MCNC: 0.3 MG/DL — SIGNIFICANT CHANGE UP (ref 0.2–1.2)
BUN SERPL-MCNC: 9 MG/DL — SIGNIFICANT CHANGE UP (ref 7–23)
CALCIUM SERPL-MCNC: 9.2 MG/DL — SIGNIFICANT CHANGE UP (ref 8.5–10.1)
CHLORIDE SERPL-SCNC: 108 MMOL/L — SIGNIFICANT CHANGE UP (ref 96–108)
CO2 SERPL-SCNC: 25 MMOL/L — SIGNIFICANT CHANGE UP (ref 22–31)
CREAT SERPL-MCNC: 0.79 MG/DL — SIGNIFICANT CHANGE UP (ref 0.5–1.3)
GLUCOSE SERPL-MCNC: 78 MG/DL — SIGNIFICANT CHANGE UP (ref 70–99)
HCT VFR BLD CALC: 35.8 % — SIGNIFICANT CHANGE UP (ref 34.5–45)
HGB BLD-MCNC: 11.8 G/DL — SIGNIFICANT CHANGE UP (ref 11.5–15.5)
LIDOCAIN IGE QN: 647 U/L — HIGH (ref 73–393)
MAGNESIUM SERPL-MCNC: 2 MG/DL — SIGNIFICANT CHANGE UP (ref 1.6–2.6)
MCHC RBC-ENTMCNC: 26.2 PG — LOW (ref 27–34)
MCHC RBC-ENTMCNC: 33 GM/DL — SIGNIFICANT CHANGE UP (ref 32–36)
MCV RBC AUTO: 79.6 FL — LOW (ref 80–100)
NRBC # BLD: 0 /100 WBCS — SIGNIFICANT CHANGE UP (ref 0–0)
PHOSPHATE SERPL-MCNC: 3.3 MG/DL — SIGNIFICANT CHANGE UP (ref 2.5–4.5)
PLATELET # BLD AUTO: 414 K/UL — HIGH (ref 150–400)
POTASSIUM SERPL-MCNC: 3.3 MMOL/L — LOW (ref 3.5–5.3)
POTASSIUM SERPL-SCNC: 3.3 MMOL/L — LOW (ref 3.5–5.3)
PROT SERPL-MCNC: 7 GM/DL — SIGNIFICANT CHANGE UP (ref 6–8.3)
RBC # BLD: 4.5 M/UL — SIGNIFICANT CHANGE UP (ref 3.8–5.2)
RBC # FLD: 13.2 % — SIGNIFICANT CHANGE UP (ref 10.3–14.5)
SODIUM SERPL-SCNC: 139 MMOL/L — SIGNIFICANT CHANGE UP (ref 135–145)
SURGICAL PATHOLOGY STUDY: SIGNIFICANT CHANGE UP
WBC # BLD: 10.36 K/UL — SIGNIFICANT CHANGE UP (ref 3.8–10.5)
WBC # FLD AUTO: 10.36 K/UL — SIGNIFICANT CHANGE UP (ref 3.8–10.5)

## 2019-11-21 PROCEDURE — 99232 SBSQ HOSP IP/OBS MODERATE 35: CPT

## 2019-11-21 RX ORDER — ACETAMINOPHEN 500 MG
2 TABLET ORAL
Qty: 0 | Refills: 0 | DISCHARGE
Start: 2019-11-21

## 2019-11-21 RX ORDER — POTASSIUM CHLORIDE 20 MEQ
40 PACKET (EA) ORAL ONCE
Refills: 0 | Status: COMPLETED | OUTPATIENT
Start: 2019-11-21 | End: 2019-11-21

## 2019-11-21 RX ADMIN — Medication 40 MILLIEQUIVALENT(S): at 11:05

## 2019-11-21 RX ADMIN — HEPARIN SODIUM 5000 UNIT(S): 5000 INJECTION INTRAVENOUS; SUBCUTANEOUS at 05:56

## 2019-11-21 NOTE — DISCHARGE NOTE PROVIDER - NSDCMRMEDTOKEN_GEN_ALL_CORE_FT
acetaminophen 325 mg oral tablet: 2 tab(s) orally every 6 hours, As needed, Temp greater or equal to 38C (100.4F), Mild Pain (1 - 3)

## 2019-11-21 NOTE — DISCHARGE NOTE PROVIDER - CARE PROVIDER_API CALL
Alessandro Chester (MD)  Emergency Medicine  Adult  [address] [facility] [facility]  [place] [id number]  Phone: [phone]  Fax: [phone]  Follow Up Time:

## 2019-11-21 NOTE — PROGRESS NOTE ADULT - REASON FOR ADMISSION
gallstone pancreatitis

## 2019-11-21 NOTE — DISCHARGE NOTE NURSING/CASE MANAGEMENT/SOCIAL WORK - PATIENT PORTAL LINK FT
You can access the FollowMyHealth Patient Portal offered by [facility] by registering at the following website: [email or web address]. By joining [facility]’s FollowMyHealth portal, you will also be able to view your health information using other applications (apps) compatible with our system.

## 2019-11-21 NOTE — PROGRESS NOTE ADULT - ASSESSMENT
cont rx REVIEW OF SYMPTOMS      Able to give ROS  Yes     RELIABLE No   CONSTITUTIONAL Weakness Yes  Chills No Vision changes No  ENDOCRINE No unexplained hair loss No heat or cold intolerance    ALLERGY No hives  Sore throat No   RESP Coughing blood no  Shortness of breath YES   NEURO No Headache  Confusion Pain neck No   CARDIAC No Chest pain No Palpitations   GI No Pain abdomen NO   Vomiting NO     PHYSICAL EXAM    HEENT Unremarkable PERRLA atraumatic   RESP Fair air entry EXP prolonged    Harsh breath sound Resp distres mild   CARDIAC S1 S2 No S3     NO JVD    ABDOMEN SOFT BS PRESENT NOT DISTENDED No hepatosplenomegaly PEDAL EDEMA present No calf tenderness  NO rash   GENERAL Not TOXIC looking    VITALS/LABS    11/21/2019 afeb 65 112/55 16 95%   11/21/2019 W 10.3 Hb 11.8 Plt 414 Na 139 K 3.3 CO2 25 Cr .7     PT DESCRIPTION     53 f admitted with pain abd No signi PMH   11/10 US showed mild wall thickening suggesting choldecystitis and labs showed pancreatitis Pt was managed conservatively for gallstone pancreatitis   Pulm consulted 11/15/2019 because of small bl effsns and consolidation lower lobes on ctap 11/14      PT DATA ASSESSMENT RECOMMENDATION DETAILS         PNEUMONIA RULED OUT  A/R Basal consolidation or atelectasis on ct chest likely represent compressive atelectasis from pl effsns   If fever leukocytosis will Rx as aspiration pneumonia   11/16 pc was neg which indicates pt does not have bact pneumon      PLEURAL EFFSNS   Pl effsns likely secondary to pancreatic inflammation   If persist or increased  willget diagnostic thoraxc on larger l effsn     PULMONARY CLEARANCE   Pt is in optimal pulm shape and may goi for gb surgery if indicated     RESP GAS EXCHANGE   Monitor po Keep po betw 90 and 95%     GALL STONE PANCREATITIS   Cholecystectimy being planned     TIME SPENT Over 25 minutes aggregate care time spent on encounter; activities included   direct pt care, counseling and/or coordinating care reviewing notes, lab data/ imaging , discussion with multidisciplinary team/ pt /family. Risks, benefits, alternatives  discussed in detail.

## 2019-11-21 NOTE — DISCHARGE NOTE PROVIDER - HOSPITAL COURSE
This is a 53F who presented with abdominal pain was found to have acute gallstone pancreatitis. She as admitted and treated conservatively for the pancreatitis with bowel rest and IVF resuscitation. On HD #9 her pancreatitis has resolved and she was stable for surgery. She underwent an uncomplicated laparoscopic cholecystectomy. Post op, she was observed on the floors, started on a low fat diet and her pain was well controlled. She was out of bed, ambulating, tolerating a regular diet and her pain had improved. She was cleared for discharge home with instructions to follow up outpatient in 1-2weeks. She was given wound care instuctions.

## 2019-11-21 NOTE — DISCHARGE NOTE PROVIDER - NSDCCPCAREPLAN_GEN_ALL_CORE_FT
PRINCIPAL DISCHARGE DIAGNOSIS  Diagnosis: Gallstone pancreatitis  Assessment and Plan of Treatment: srinivas

## 2019-11-21 NOTE — CHART NOTE - NSCHARTNOTEFT_GEN_A_CORE
Date 11/21/201    [address] [address]      To Whom It May Concern,     Diane Hill  was admitted on 11/18/2019 . She is still hospitalized at  [facility] at this time    If any questions or concerns please call.     Thanks     James M. Behan, PA-C  Senior Surgical Physician Assistant  [phone]  Beeper # [id number]

## 2019-11-21 NOTE — PROGRESS NOTE ADULT - SUBJECTIVE AND OBJECTIVE BOX
BOBBY MALDONADO    [facility] [address]    Patient is a 53y old  Female who presents with a chief complaint of gallstone pancreatitis (20 Nov 2019 20:14)       Allergies    No Known Allergies    Intolerances        HPI:  54 y/o female discharged yesterday from surgery's service after being conservatively managed for RUQ pain with findings of cholelithiasis without signs of acute cholecystitis, returned to ED this morning with 9/10 crampy intermittent abdominal pain now to the LUQ radiating to the LLQ.  Pt's reports no inciting or alleviating factors.  She was sitting at home last evening eating cookies, tea, and an orange when the pain suddenly began.  Pt reports last BM was this morning at 8 am and was normal.  Denies nausea or vomiting.  Pt denies fever, chills, shortness of breath, chest pain. (10 Nov 2019 19:44)      PAST MEDICAL & SURGICAL HISTORY:  No pertinent past medical history  H/O post-sterilization tuboplasty      FAMILY HISTORY:  Family history of diabetes mellitus (DM)  FH: HTN (hypertension)        MEDICATIONS   acetaminophen   Tablet .. 650 milliGRAM(s) Oral every 6 hours PRN  guaiFENesin    Syrup 200 milliGRAM(s) Oral every 6 hours PRN  heparin  Injectable 5000 Unit(s) SubCutaneous every 12 hours  melatonin 3 milliGRAM(s) Oral at bedtime PRN  ondansetron Injectable 4 milliGRAM(s) IV Push every 6 hours PRN  oxycodone    5 mG/acetaminophen 325 mG 2 Tablet(s) Oral every 4 hours PRN      Vital Signs Last 24 Hrs  T(C): 36.9 (21 Nov 2019 06:08), Max: 37.1 (20 Nov 2019 21:30)  T(F): 98.4 (21 Nov 2019 06:08), Max: 98.8 (20 Nov 2019 21:30)  HR: 66 (21 Nov 2019 06:08) (61 - 87)  BP: 112/64 (21 Nov 2019 06:08) (100/66 - 123/74)  BP(mean): --  RR: 16 (21 Nov 2019 06:08) (12 - 20)  SpO2: 98% (21 Nov 2019 06:08) (95% - 100%)      11-20-19 @ 07:01  -  11-21-19 @ 07:00  --------------------------------------------------------  IN: 360 mL / OUT: 0 mL / NET: 360 mL            LABS:                        11.8   10.36 )-----------( 414      ( 21 Nov 2019 06:32 )             35.8     11-21    139  |  108  |  9   ----------------------------<  78  3.3<L>   |  25  |  0.79    Ca    9.2      21 Nov 2019 06:32  Phos  3.3     11-21  Mg     2.0     11-21    TPro  7.0  /  Alb  2.9<L>  /  TBili  0.3  /  DBili  x   /  AST  55<H>  /  ALT  86<H>  /  AlkPhos  108  11-21    PT/INR - ( 20 Nov 2019 06:41 )   PT: 15.2 sec;   INR: 1.34 ratio         PTT - ( 20 Nov 2019 06:41 )  PTT:26.7 sec          WBC:  WBC Count: 10.36 K/uL (11-21 @ 06:32)  WBC Count: 5.40 K/uL (11-20 @ 06:41)  WBC Count: 6.21 K/uL (11-19 @ 08:18)  WBC Count: 6.18 K/uL (11-18 @ 07:10)  WBC Count: 7.39 K/uL (11-17 @ 10:20)      MICROBIOLOGY:  RECENT CULTURES:              PT/INR - ( 20 Nov 2019 06:41 )   PT: 15.2 sec;   INR: 1.34 ratio         PTT - ( 20 Nov 2019 06:41 )  PTT:26.7 sec    Sodium:  Sodium, Serum: 139 mmol/L (11-21 @ 06:32)  Sodium, Serum: 141 mmol/L (11-20 @ 06:41)  Sodium, Serum: 138 mmol/L (11-19 @ 08:18)  Sodium, Serum: 139 mmol/L (11-18 @ 07:10)  Sodium, Serum: 142 mmol/L (11-17 @ 10:20)      0.79 mg/dL 11-21 @ 06:32  0.70 mg/dL 11-20 @ 06:41  0.65 mg/dL 11-19 @ 08:18  0.61 mg/dL 11-18 @ 07:10  0.72 mg/dL 11-17 @ 10:20      Hemoglobin:  Hemoglobin: 11.8 g/dL (11-21 @ 06:32)  Hemoglobin: 12.2 g/dL (11-20 @ 06:41)  Hemoglobin: 12.1 g/dL (11-19 @ 08:18)  Hemoglobin: 12.1 g/dL (11-18 @ 07:10)  Hemoglobin: 12.2 g/dL (11-17 @ 10:20)      Platelets: Platelet Count - Automated: 414 K/uL (11-21 @ 06:32)  Platelet Count - Automated: 403 K/uL (11-20 @ 06:41)  Platelet Count - Automated: 350 K/uL (11-19 @ 08:18)  Platelet Count - Automated: 313 K/uL (11-18 @ 07:10)  Platelet Count - Automated: 289 K/uL (11-17 @ 10:20)      LIVER FUNCTIONS - ( 21 Nov 2019 06:32 )  Alb: 2.9 g/dL / Pro: 7.0 gm/dL / ALK PHOS: 108 U/L / ALT: 86 U/L / AST: 55 U/L / GGT: x                 RADIOLOGY & ADDITIONAL STUDIES:

## 2019-11-21 NOTE — PROGRESS NOTE ADULT - SUBJECTIVE AND OBJECTIVE BOX
Gastroenterology  Patient seen and examined bedside resting comfortably.  No complaints offered. Abdominal pain is well controlled.  Denies nausea and vomiting. Tolerating diet.  Normal flatus/BM.     T(F): 98.4 (11-21-19 @ 06:08), Max: 98.8 (11-20-19 @ 21:30)  HR: 66 (11-21-19 @ 06:08) (63 - 87)  BP: 112/64 (11-21-19 @ 06:08) (106/58 - 112/64)  RR: 16 (11-21-19 @ 06:08) (16 - 18)  SpO2: 98% (11-21-19 @ 06:08) (95% - 99%)  Wt(kg): --  CAPILLARY BLOOD GLUCOSE    PHYSICAL EXAM:  General: NAD, WDWN.   Neuro:  Alert & responsive  HEENT: NCAT, EOMI, conjunctiva clear  CV: +S1+S2 regular rate and rhythm  Lung: clear to ausculation bilaterally, respirations nonlabored, good inspiratory effort  Abdomen: soft, Non tender, No Distension. Normal active BS, Incisions sites clean with DSD  Extremities: no pedal edema or calf tenderness noted     LABS:                        11.8   10.36 )-----------( 414      ( 21 Nov 2019 06:32 )             35.8     11-21    139  |  108  |  9   ----------------------------<  78  3.3<L>   |  25  |  0.79    Ca    9.2      21 Nov 2019 06:32  Phos  3.3     11-21  Mg     2.0     11-21    TPro  7.0  /  Alb  2.9<L>  /  TBili  0.3  /  DBili  x   /  AST  55<H>  /  ALT  86<H>  /  AlkPhos  108  11-21    LIVER FUNCTIONS - ( 21 Nov 2019 06:32 )  Alb: 2.9 g/dL / Pro: 7.0 gm/dL / ALK PHOS: 108 U/L / ALT: 86 U/L / AST: 55 U/L / GGT: x           PT/INR - ( 20 Nov 2019 06:41 )   PT: 15.2 sec;   INR: 1.34 ratio         PTT - ( 20 Nov 2019 06:41 )  PTT:26.7 sec  I&O's Detail    20 Nov 2019 07:01  -  21 Nov 2019 07:00  --------------------------------------------------------  IN:    Oral Fluid: 360 mL  Total IN: 360 mL    OUT:  Total OUT: 0 mL    Total NET: 360 mL

## 2019-12-02 DIAGNOSIS — E43 UNSPECIFIED SEVERE PROTEIN-CALORIE MALNUTRITION: ICD-10-CM

## 2019-12-02 DIAGNOSIS — J90 PLEURAL EFFUSION, NOT ELSEWHERE CLASSIFIED: ICD-10-CM

## 2019-12-02 DIAGNOSIS — Z98.51 TUBAL LIGATION STATUS: ICD-10-CM

## 2019-12-02 DIAGNOSIS — K85.10 BILIARY ACUTE PANCREATITIS WITHOUT NECROSIS OR INFECTION: ICD-10-CM

## 2019-12-02 DIAGNOSIS — E87.6 HYPOKALEMIA: ICD-10-CM

## 2019-12-05 ENCOUNTER — APPOINTMENT (OUTPATIENT)
Dept: SURGERY | Facility: CLINIC | Age: 54
End: 2019-12-05
Payer: COMMERCIAL

## 2019-12-05 VITALS
TEMPERATURE: 98.4 F | WEIGHT: 159.19 LBS | DIASTOLIC BLOOD PRESSURE: 68 MMHG | HEIGHT: 63 IN | OXYGEN SATURATION: 99 % | HEART RATE: 92 BPM | SYSTOLIC BLOOD PRESSURE: 118 MMHG | BODY MASS INDEX: 28.21 KG/M2

## 2019-12-05 PROCEDURE — 99024 POSTOP FOLLOW-UP VISIT: CPT

## 2022-10-03 NOTE — PROGRESS NOTE ADULT - ASSESSMENT
ABDOMINAL PAIN with pancreatitis, + gallstones, dilated CBD 7 mm, mild wall thickening.     Recent admission for similar symptoms with elevated ast/alt 100-200, normal bili, MRCP negative for choledocholithiasis and HIDA negative for cholecystitis.     On current admission presents with abdominal pain with lipase 30k. Denies any new medications, ETOH abuse, trauma, infections.     MRCP repeated 11/13: noted cholelithiasis with pericholecystic fluid ?cholecystitis, acute pancreatitis, NO evidence of choleodocholithiasis, mild PD dilation 4 mm.     Appreciate surgery recs re: further management of cholecystitis. Surgery delayed given fever 100.4 yesterday. [Negative] : Neurological [Petechiae] : no petechiae [Ecchymoses] : no ecchymoses [Hematochezia] : no hematochezia [Hematuria] : no hematuria

## 2022-10-23 ENCOUNTER — EMERGENCY (EMERGENCY)
Facility: HOSPITAL | Age: 57
LOS: 0 days | Discharge: HOME | End: 2022-10-23
Attending: EMERGENCY MEDICINE

## 2022-10-23 VITALS
OXYGEN SATURATION: 99 % | TEMPERATURE: 98 F | RESPIRATION RATE: 17 BRPM | SYSTOLIC BLOOD PRESSURE: 124 MMHG | HEART RATE: 64 BPM | DIASTOLIC BLOOD PRESSURE: 80 MMHG

## 2022-10-23 VITALS
RESPIRATION RATE: 18 BRPM | HEIGHT: 65 IN | DIASTOLIC BLOOD PRESSURE: 67 MMHG | WEIGHT: 195.11 LBS | SYSTOLIC BLOOD PRESSURE: 112 MMHG | OXYGEN SATURATION: 95 % | TEMPERATURE: 98 F | HEART RATE: 69 BPM

## 2022-10-23 DIAGNOSIS — Z90.49 ACQUIRED ABSENCE OF OTHER SPECIFIED PARTS OF DIGESTIVE TRACT: ICD-10-CM

## 2022-10-23 DIAGNOSIS — R11.2 NAUSEA WITH VOMITING, UNSPECIFIED: ICD-10-CM

## 2022-10-23 DIAGNOSIS — Z31.0 ENCOUNTER FOR REVERSAL OF PREVIOUS STERILIZATION: Chronic | ICD-10-CM

## 2022-10-23 DIAGNOSIS — Z20.822 CONTACT WITH AND (SUSPECTED) EXPOSURE TO COVID-19: ICD-10-CM

## 2022-10-23 DIAGNOSIS — R10.13 EPIGASTRIC PAIN: ICD-10-CM

## 2022-10-23 DIAGNOSIS — R10.33 PERIUMBILICAL PAIN: ICD-10-CM

## 2022-10-23 LAB
ALBUMIN SERPL ELPH-MCNC: 3.7 G/DL — SIGNIFICANT CHANGE UP (ref 3.3–5)
ALP SERPL-CCNC: 147 U/L — HIGH (ref 40–120)
ALT FLD-CCNC: 23 U/L — SIGNIFICANT CHANGE UP (ref 12–78)
ANION GAP SERPL CALC-SCNC: 5 MMOL/L — SIGNIFICANT CHANGE UP (ref 5–17)
APPEARANCE UR: CLEAR — SIGNIFICANT CHANGE UP
AST SERPL-CCNC: 15 U/L — SIGNIFICANT CHANGE UP (ref 15–37)
BACTERIA # UR AUTO: ABNORMAL
BASOPHILS # BLD AUTO: 0.03 K/UL — SIGNIFICANT CHANGE UP (ref 0–0.2)
BASOPHILS NFR BLD AUTO: 0.3 % — SIGNIFICANT CHANGE UP (ref 0–2)
BILIRUB SERPL-MCNC: 0.4 MG/DL — SIGNIFICANT CHANGE UP (ref 0.2–1.2)
BILIRUB UR-MCNC: NEGATIVE — SIGNIFICANT CHANGE UP
BUN SERPL-MCNC: 10 MG/DL — SIGNIFICANT CHANGE UP (ref 7–23)
CALCIUM SERPL-MCNC: 9.7 MG/DL — SIGNIFICANT CHANGE UP (ref 8.5–10.1)
CHLORIDE SERPL-SCNC: 108 MMOL/L — SIGNIFICANT CHANGE UP (ref 96–108)
CO2 SERPL-SCNC: 27 MMOL/L — SIGNIFICANT CHANGE UP (ref 22–31)
COLOR SPEC: YELLOW — SIGNIFICANT CHANGE UP
CREAT SERPL-MCNC: 0.57 MG/DL — SIGNIFICANT CHANGE UP (ref 0.5–1.3)
DIFF PNL FLD: NEGATIVE — SIGNIFICANT CHANGE UP
EGFR: 106 ML/MIN/1.73M2 — SIGNIFICANT CHANGE UP
EOSINOPHIL # BLD AUTO: 0.08 K/UL — SIGNIFICANT CHANGE UP (ref 0–0.5)
EOSINOPHIL NFR BLD AUTO: 0.9 % — SIGNIFICANT CHANGE UP (ref 0–6)
EPI CELLS # UR: SIGNIFICANT CHANGE UP
FLUAV AG NPH QL: SIGNIFICANT CHANGE UP
FLUBV AG NPH QL: SIGNIFICANT CHANGE UP
GLUCOSE SERPL-MCNC: 88 MG/DL — SIGNIFICANT CHANGE UP (ref 70–99)
GLUCOSE UR QL: NEGATIVE MG/DL — SIGNIFICANT CHANGE UP
HCG SERPL-ACNC: 1 MIU/ML — SIGNIFICANT CHANGE UP
HCT VFR BLD CALC: 42.7 % — SIGNIFICANT CHANGE UP (ref 34.5–45)
HGB BLD-MCNC: 13.8 G/DL — SIGNIFICANT CHANGE UP (ref 11.5–15.5)
IMM GRANULOCYTES NFR BLD AUTO: 0.2 % — SIGNIFICANT CHANGE UP (ref 0–0.9)
KETONES UR-MCNC: NEGATIVE — SIGNIFICANT CHANGE UP
LEUKOCYTE ESTERASE UR-ACNC: NEGATIVE — SIGNIFICANT CHANGE UP
LIDOCAIN IGE QN: 105 U/L — SIGNIFICANT CHANGE UP (ref 73–393)
LYMPHOCYTES # BLD AUTO: 2.27 K/UL — SIGNIFICANT CHANGE UP (ref 1–3.3)
LYMPHOCYTES # BLD AUTO: 24.5 % — SIGNIFICANT CHANGE UP (ref 13–44)
MCHC RBC-ENTMCNC: 25.9 PG — LOW (ref 27–34)
MCHC RBC-ENTMCNC: 32.3 G/DL — SIGNIFICANT CHANGE UP (ref 32–36)
MCV RBC AUTO: 80.3 FL — SIGNIFICANT CHANGE UP (ref 80–100)
MONOCYTES # BLD AUTO: 0.68 K/UL — SIGNIFICANT CHANGE UP (ref 0–0.9)
MONOCYTES NFR BLD AUTO: 7.3 % — SIGNIFICANT CHANGE UP (ref 2–14)
NEUTROPHILS # BLD AUTO: 6.19 K/UL — SIGNIFICANT CHANGE UP (ref 1.8–7.4)
NEUTROPHILS NFR BLD AUTO: 66.8 % — SIGNIFICANT CHANGE UP (ref 43–77)
NITRITE UR-MCNC: NEGATIVE — SIGNIFICANT CHANGE UP
NRBC # BLD: 0 /100 WBCS — SIGNIFICANT CHANGE UP (ref 0–0)
PH UR: 6.5 — SIGNIFICANT CHANGE UP (ref 5–8)
PLATELET # BLD AUTO: 230 K/UL — SIGNIFICANT CHANGE UP (ref 150–400)
POTASSIUM SERPL-MCNC: 4.1 MMOL/L — SIGNIFICANT CHANGE UP (ref 3.5–5.3)
POTASSIUM SERPL-SCNC: 4.1 MMOL/L — SIGNIFICANT CHANGE UP (ref 3.5–5.3)
PROT SERPL-MCNC: 7.6 GM/DL — SIGNIFICANT CHANGE UP (ref 6–8.3)
PROT UR-MCNC: 15 MG/DL
RBC # BLD: 5.32 M/UL — HIGH (ref 3.8–5.2)
RBC # FLD: 14.1 % — SIGNIFICANT CHANGE UP (ref 10.3–14.5)
RBC CASTS # UR COMP ASSIST: NEGATIVE /HPF — SIGNIFICANT CHANGE UP (ref 0–4)
SARS-COV-2 RNA SPEC QL NAA+PROBE: SIGNIFICANT CHANGE UP
SODIUM SERPL-SCNC: 140 MMOL/L — SIGNIFICANT CHANGE UP (ref 135–145)
SP GR SPEC: 1.02 — SIGNIFICANT CHANGE UP (ref 1.01–1.02)
UROBILINOGEN FLD QL: NEGATIVE MG/DL — SIGNIFICANT CHANGE UP
WBC # BLD: 9.27 K/UL — SIGNIFICANT CHANGE UP (ref 3.8–10.5)
WBC # FLD AUTO: 9.27 K/UL — SIGNIFICANT CHANGE UP (ref 3.8–10.5)
WBC UR QL: SIGNIFICANT CHANGE UP

## 2022-10-23 PROCEDURE — 74177 CT ABD & PELVIS W/CONTRAST: CPT | Mod: 26,MA

## 2022-10-23 PROCEDURE — 99285 EMERGENCY DEPT VISIT HI MDM: CPT

## 2022-10-23 RX ORDER — SODIUM CHLORIDE 9 MG/ML
1000 INJECTION INTRAMUSCULAR; INTRAVENOUS; SUBCUTANEOUS ONCE
Refills: 0 | Status: COMPLETED | OUTPATIENT
Start: 2022-10-23 | End: 2022-10-23

## 2022-10-23 RX ORDER — ACETAMINOPHEN 500 MG
1000 TABLET ORAL ONCE
Refills: 0 | Status: COMPLETED | OUTPATIENT
Start: 2022-10-23 | End: 2022-10-23

## 2022-10-23 RX ORDER — ONDANSETRON 8 MG/1
4 TABLET, FILM COATED ORAL ONCE
Refills: 0 | Status: COMPLETED | OUTPATIENT
Start: 2022-10-23 | End: 2022-10-23

## 2022-10-23 RX ORDER — PANTOPRAZOLE SODIUM 20 MG/1
40 TABLET, DELAYED RELEASE ORAL ONCE
Refills: 0 | Status: COMPLETED | OUTPATIENT
Start: 2022-10-23 | End: 2022-10-23

## 2022-10-23 RX ORDER — FAMOTIDINE 10 MG/ML
1 INJECTION INTRAVENOUS
Qty: 14 | Refills: 0
Start: 2022-10-23 | End: 2022-11-05

## 2022-10-23 RX ADMIN — SODIUM CHLORIDE 1000 MILLILITER(S): 9 INJECTION INTRAMUSCULAR; INTRAVENOUS; SUBCUTANEOUS at 15:28

## 2022-10-23 RX ADMIN — PANTOPRAZOLE SODIUM 40 MILLIGRAM(S): 20 TABLET, DELAYED RELEASE ORAL at 15:27

## 2022-10-23 RX ADMIN — ONDANSETRON 4 MILLIGRAM(S): 8 TABLET, FILM COATED ORAL at 15:28

## 2022-10-23 RX ADMIN — Medication 400 MILLIGRAM(S): at 15:27

## 2022-10-23 NOTE — ED PROVIDER NOTE - PROGRESS NOTE DETAILS
Pt sts she has no more abd pain no n/v Pt is given and explained all test reports and advised to follow up with pmd, and gastroenterologist and return if symptoms persist or worsen.

## 2022-10-23 NOTE — ED PROVIDER NOTE - NONTENDER LOCATION
left upper quadrant/right upper quadrant/left lower quadrant/right lower quadrant/umbilical/left costovertebral angle/right costovertebral angle

## 2022-10-23 NOTE — ED PROVIDER NOTE - CONSTITUTIONAL, MLM
Well appearing, awake, alert, oriented to person, place, time/situation and in no apparent distress. Speaking in clear full sentences no nasal flaring no shoulders retractions no diaphoresis, appears very comfortable sitting up in the chair normal...

## 2022-10-23 NOTE — ED ADULT NURSE NOTE - ED COMFORT CARE
[FreeTextEntry1] : Here for follow-up, repeat labs, tiredness.  [de-identified] : Here for follow-up, repeat labs, tiredness. \par \par Sometimes chest tightness in the morning, ongoing for months.  No symptoms currently.  No chest pain or palpitations. Denies GERD symptoms. Has not seen Cardiology yet.  Also having some upper back pain-been on the computer a lot for work. \par \par Feeling tired.  Work has been very busy.  Only sleeping 4-5 hours per night.\par Needs Gastro referral due for colonoscopy in fall. \par Medications and allergies reviewed.  Has now been taking cholesterol medication consistently. \par  Family informed

## 2022-10-23 NOTE — ED PROVIDER NOTE - OBJECTIVE STATEMENT
56 years old female walked in c/o epigastric periumbilical and suprapubic abd pain nausea increases with food intake x 2 days Pt has a hx of cholecystectomy. Pt denies headache, dizziness, blurred visions, light sensitivities, focal/distal weakness or numbness, neck./back/hips/calfs pain, cough, sob, chest pain fever, chills, dysuria, hematuria, vaginal spotting or discharge or irregular bowel movements.

## 2022-10-23 NOTE — ED PROVIDER NOTE - CARE PROVIDER_API CALL
Santos Uriostegui S  GASTROENTEROLOGY  [address], [address]  [place] [address]  Phone: [phone]  Fax: [phone]  Follow Up Time:

## 2022-10-25 LAB
CULTURE RESULTS: SIGNIFICANT CHANGE UP
SPECIMEN SOURCE: SIGNIFICANT CHANGE UP

## 2024-06-07 NOTE — PROGRESS NOTE ADULT - PROVIDER SPECIALTY LIST ADULT
History & Physical    Date of Admission: 6/6/2024    Chief Complaint   Patient presents with    Flank Pain       HPI:  This patient is a 41 year old female with a PMH listed below presents for evaluation of right sided abdominal pain associated with right flank pain onset this evening. Pt reports sx similar to her previous episode of kidney stone. Adds she also has lower back pain but this is chronic. Reports nausea. Denies emesis.   In ED UA concern for UTI. Ct abd showed  6 mm right ureteropelvic junction calculus with associated mild  right-sided hydronephrosis. Urology consulted who will see pt in am        Past Medical History:   Diagnosis Date    Varicella without mention of complication        Past Surgical History:   Procedure Laterality Date    Laparoscopy, appendectomy  06/04/2011    Dr. Jose Melo    Other lithotripsy  04/2016    Kidney stones    Repair umbilical ric,4+y/o,reduc  06/04/2011    Dr. Jose Melo-Umbilical hernia repair, primary    Tonsillectomy and adenoidectomy         (Not in a hospital admission)      ALLERGIES:   Allergen Reactions    Sinus Congestion-Pain Daytime Runny Nose    Penicillin G HIVES       Family History   Adopted: Yes   Problem Relation Age of Onset    NEGATIVE FAMILY HX OF Other         Adopted--limited knowledge of family hx.       SOCIAL HISTORY:  Social History     Tobacco Use    Smoking status: Never     Passive exposure: Never    Smokeless tobacco: Never   Substance Use Topics    Alcohol use: Yes     Alcohol/week: 2.0 standard drinks of alcohol     Types: 1 Glasses of wine, 1 Cans of beer per week     Comment: SOCIAL    Drug use: No       CODE STATUS: full    PMD:     ROS:  Full 10 point review of systems performed and are otherwise negative unless listed in HPI.       PHYSICALEXAM:  Vital Signs: Visit Vitals  BP (!) 148/94   Pulse (!) 100   Temp 98.4 °F (36.9 °C) (Oral)   Resp 16   Ht 5' 5\" (1.651 m)   Wt 69.4 kg (153 lb)   LMP 03/01/2024   SpO2 100%   BMI  Surgery 25.46 kg/m²     General: NAD,  appearing female  HEENT: EOM intact, PERRL, no scleral icterus or conjunctival pallor, no nasal discharge, oropharynx without erythema or exudate.  Neck: Trachea midline, supple,   Pulmonary: No retractions or increased work of breathing, clear to auscultation bilaterally.  Cardiovascular: RRR, normal S1 S2, no murmurs,Abdomen: +BS, soft, nontender, non-distended, no hepatosplenomegaly.  : No suprapubic or costovertebral angle tenderness.  Extremities: No edema or cyanosis, peripheral pulses 2+ and symmetric.  Musculoskeletal: ROM and motor strength grossly normal, no joint erythema or swelling.  Skin: No rashes, jaundice or other lesions.  Neuro: CN 2-12 grossly intact, no gross motor or sensory deficits,   Psych: Affect and mood appropriate.    LABS:  CBC:  Lab Results   Component Value Date/Time    WBC 9.9 06/06/2024 09:36 PM    WBC 8.3 07/04/2014 06:25 AM    HGB 13.9 06/06/2024 09:36 PM    HGB 11.7 (L) 07/05/2014 06:26 AM    HCT 41.0 06/06/2024 09:36 PM    HCT 36.0 07/05/2014 06:26 AM     06/06/2024 09:36 PM     07/04/2014 06:25 AM     12/11/2013 09:40 AM     09/04/2012 02:48 PM       CMP:  Lab Results   Component Value Date/Time    SODIUM 139 06/06/2024 09:36 PM    SODIUM 138 06/04/2011 11:20 PM    POTASSIUM 4.1 06/06/2024 09:36 PM    POTASSIUM 3.6 06/04/2011 11:20 PM    CHLORIDE 103 06/06/2024 09:36 PM    CHLORIDE 101 06/04/2011 11:20 PM    CO2 24 06/06/2024 09:36 PM    CO2 28 06/04/2011 11:20 PM    CO2 29 09/21/2001 04:27 PM    BUN 21 (H) 06/06/2024 09:36 PM    BUN 15 06/04/2011 11:20 PM    CREATININE 0.87 06/06/2024 09:36 PM    CREATININE 0.80 06/04/2011 11:20 PM    GLUCOSE 96 06/06/2024 09:36 PM    GLUCOSE 86 06/04/2011 11:20 PM    CALCIUM 8.6 06/06/2024 09:36 PM    CALCIUM 9.0 06/04/2011 11:20 PM    CALCIUM 9.3 09/21/2001 04:27 PM    ALBUMIN 3.7 06/06/2024 09:36 PM    ALBUMIN 4.2 06/04/2011 11:20 PM     (H) 06/06/2024 09:36 PM      (H) 06/04/2011 11:20 PM    GPT 17 06/06/2024 09:36 PM    GPT 30 06/04/2011 11:20 PM    ALKPT 54 06/06/2024 09:36 PM    ALKPT 64 06/04/2011 11:20 PM    BILIRUBIN 0.4 06/06/2024 09:36 PM    BILIRUBIN 0.3 06/04/2011 11:20 PM           IMAGING:  Ct abd      1. 6 mm right ureteropelvic junction calculus with associated mild  right-sided hydronephrosis  2. Bilateral small nonobstructing intrarenal calculi.  3. Bilateral renal cortical cysts.  4. Post appendectomy.      ASSESSMENT AND PLAN:    # 6 mm right ureteropelvic junction calculus with associated mild  right-sided hydronephrosis. UA concern for UTI  Pt did not meet sepsis criteria  Start with iv ceftriaxone  Urology consulted  NPO after midnight  Pain control   Flomax  IVF      # isolated elevated AST  Monitor          DVT   CODE STATUS:      Is the patient expected to require at least a two midnight stay in the hospital? Yes - I certify that I expect inpatient services for greater than two midnights are medically necessary for this patient. Please see H&P and MD Progress Notes for additional information about the patient's course of treatment.

## 2025-01-30 NOTE — H&P ADULT - NSHPPHYSICALEXAM_GEN_ALL_CORE
NPO since midnight. Consent signed as ordered. Pt verbalizes understanding.   Gen: Alert, c/o pain  Head: NC, AT, EOMI, normal lids/conjunctiva  ENT: normal hearing, patent oropharynx, MMM  Neck: supple, no tenderness/meningismus, Trachea midline  Pulm: Bilateral clear BS, normal resp effort, no wheeze/stridor/retractions  CV: RRR, no M/R/G, +dist pulses  Abd: umbilical incision from prior sx well healed, ND, +BS, soft, +epigastric TTP, no guarding/rebound tenderness  Mskel: no edema/erythema/cyanosis  Skin: no rash  Neuro: no sensory/motor deficits